# Patient Record
Sex: FEMALE | Race: WHITE | NOT HISPANIC OR LATINO | Employment: FULL TIME | URBAN - METROPOLITAN AREA
[De-identification: names, ages, dates, MRNs, and addresses within clinical notes are randomized per-mention and may not be internally consistent; named-entity substitution may affect disease eponyms.]

---

## 2023-08-23 LAB
GRAM STAIN: NORMAL
STERILE FLUID CULTURE/SMEAR: NORMAL

## 2023-08-26 LAB
GRAM STAIN: ABNORMAL
TISSUE/WOUND CULTURE/SMEAR: ABNORMAL

## 2024-01-02 ENCOUNTER — APPOINTMENT (OUTPATIENT)
Dept: OTOLARYNGOLOGY | Facility: CLINIC | Age: 69
End: 2024-01-02
Payer: COMMERCIAL

## 2024-06-19 ENCOUNTER — OFFICE VISIT (OUTPATIENT)
Dept: OTOLARYNGOLOGY | Facility: CLINIC | Age: 69
End: 2024-06-19
Payer: MEDICARE

## 2024-06-19 VITALS — BODY MASS INDEX: 17.18 KG/M2 | WEIGHT: 120 LBS | HEIGHT: 70 IN

## 2024-06-19 DIAGNOSIS — H71.92 CHOLESTEATOMA OF LEFT EAR: ICD-10-CM

## 2024-06-19 DIAGNOSIS — H73.001 ACUTE MYRINGITIS, RIGHT: ICD-10-CM

## 2024-06-19 DIAGNOSIS — H92.11 OTORRHEA OF RIGHT EAR: Primary | ICD-10-CM

## 2024-06-19 DIAGNOSIS — H66.11 CHRONIC TUBOTYMPANIC SUPPURATIVE OTITIS MEDIA OF RIGHT EAR: ICD-10-CM

## 2024-06-19 DIAGNOSIS — H72.91 TYMPANIC MEMBRANE PERFORATION, RIGHT: ICD-10-CM

## 2024-06-19 PROCEDURE — 1160F RVW MEDS BY RX/DR IN RCRD: CPT | Performed by: OTOLARYNGOLOGY

## 2024-06-19 PROCEDURE — 87205 SMEAR GRAM STAIN: CPT

## 2024-06-19 PROCEDURE — 87070 CULTURE OTHR SPECIMN AEROBIC: CPT

## 2024-06-19 PROCEDURE — 99214 OFFICE O/P EST MOD 30 MIN: CPT | Performed by: OTOLARYNGOLOGY

## 2024-06-19 PROCEDURE — 1159F MED LIST DOCD IN RCRD: CPT | Performed by: OTOLARYNGOLOGY

## 2024-06-19 PROCEDURE — 87075 CULTR BACTERIA EXCEPT BLOOD: CPT

## 2024-06-19 RX ORDER — PRAVASTATIN SODIUM 20 MG/1
20 TABLET ORAL
COMMUNITY
Start: 2016-06-14 | End: 2025-03-15

## 2024-06-19 RX ORDER — CHOLECALCIFEROL (VITAMIN D3) 25 MCG
2500 TABLET,CHEWABLE ORAL
COMMUNITY

## 2024-06-19 RX ORDER — ESTRADIOL 2 MG/1
2 TABLET ORAL
COMMUNITY
Start: 2022-10-12 | End: 2025-03-15

## 2024-06-19 RX ORDER — HYDROCORTISONE 25 MG/G
CREAM TOPICAL
COMMUNITY
Start: 2021-05-10

## 2024-06-19 RX ORDER — DOXYCYCLINE HYCLATE 100 MG/1
100 TABLET, DELAYED RELEASE ORAL 2 TIMES DAILY
Qty: 28 TABLET | Refills: 0 | Status: CANCELLED | OUTPATIENT
Start: 2024-06-19 | End: 2024-07-03

## 2024-06-19 RX ORDER — DOXYCYCLINE 100 MG/1
100 CAPSULE ORAL 2 TIMES DAILY
Qty: 28 CAPSULE | Refills: 0 | Status: SHIPPED | OUTPATIENT
Start: 2024-06-19 | End: 2024-07-03

## 2024-06-19 RX ORDER — CIPROFLOXACIN AND DEXAMETHASONE 3; 1 MG/ML; MG/ML
4 SUSPENSION/ DROPS AURICULAR (OTIC) 2 TIMES DAILY
Qty: 7.5 ML | Refills: 1 | Status: SHIPPED | OUTPATIENT
Start: 2024-06-19 | End: 2024-06-29

## 2024-06-19 RX ORDER — ACETAMINOPHEN 500 MG
500 TABLET ORAL
COMMUNITY

## 2024-06-19 NOTE — PROGRESS NOTES
History Of Present Illness:  Tiffanie Omer is a 69 y.o. female with a history of left cholesteatoma and right chronic suppurative otitis media with TM perforation, she is s/p left-sided postauricular CWU mastoidectomy with cholesteatoma resection on 2/7/14 with Dr. Turner, and right-sided tympanoplasty with ossiculoplasty on 10/2/20. She presents today due to blood-tinged otorrhea. She had an episode of drainage similar to this in May. She was treated by her PCP with ofloxacin drops, short prednisone taper, and amoxicillin. She finished treatment on 5/31 and had resolution of her symptoms, but she awoke with blood-tinged, mucus drainage on Monday of this week. She resumed her ofloxacin drops on that day but continues to have similar drainage. She denies ear pain, subjective hearing changes. Does endorse mild dysequilibrium which she gets when she has an ear infection.    Recall 9/26/23:  Overall she's feeling well, no longer having ear pain or drainage. Cultures obtained 08/2023 grew out achromobacter xylosoxidans resistant to ciprofloxacin, susceptible to levaquin, ceftazidime, gentamicin, tobramycin.    Recall 8/22/23:   Patient was doing well and then had episode with water exposure at the beach on vacation in July. Since then she has had on and off drainage that has been responsive to drops but will return shortly after stopping drops. No drainage today but had drainage yesterday.     Recall 2/21/23:   In the past 1.5 months she has had two right-sided ear infections that she self-treated with amoxicillin and otic drops that she had at home. This helped clear it up. Her infections start with crackling in the ear and progresses to otorrhea and otalgia that radiates into the neck. She has custom ear molds that she uses regularly to keep the ears dry. Any moisture in the ears will trigger an ear infection. Denies change in hearing.     Recall 2/15/22   She is doing well today, she denies any problems with her  ears. She had an infection before [facility], she used the Ciprodex drops and Antibiotics she had on hand. She had a hearing test in [place] and notes it's improved since her last test. She continues to follow up with Dr. Christensen and is very pleased with her results.      Recall at initial visit:  Tiffanie is a 63 yo female referred by Dr. Johnson. The patient is referred for evaluation of chronic right-sided ear infections. She endorses bilateral hearing loss, right-sided crackling and otorrhea. Patient had left tympanomastoidectomy for cholesteatoma with Dr Turner in 2014. At the time of her surgery, she had previous right PE tube removed. The perforation never healed. At that time, he did not recommend surgical repair since conductive component of hearing loss was not significant and TM appear to be retracted down to her stapes.  She now complains of frequent right otitis media. Any time she gets moisture buildup in her right ear canal, she develops an infection. These present with crackling sensation, otorrhea, sometimes otalgia. She reports approximately 4 infections in the past 3 months. She does have a custom ear molds to wear when showering. She does wear bilateral hearing aids.    When asked about a significant past otological history including history of prior ear surgery, noise exposure, exposure to ototoxic drugs or agents, and/or family history of hearing loss, the patient admits to PE tubes and left tympanomastoidectomy as mentioned above.     Surgical History:  She has a past surgical history that includes Tympanostomy tube placement (10/25/2013); Tonsillectomy (10/25/2013); Hysterectomy (10/25/2013); Gallbladder surgery (10/25/2013); Other surgical history (06/14/2016); Other surgical history (06/14/2016); Other surgical history (01/07/2020); Other surgical history (01/07/2020); and Tubal ligation (06/14/2016).     Allergies:  Amoxicillin-pot clavulanate, Hydromorphone, Nitroglycerin, Sulfa  (sulfonamide antibiotics), Latex, and Shellfish derived    Medications:   Current Outpatient Medications   Medication Instructions    acetaminophen (TYLENOL) 500 mg, oral    ciprofloxacin-dexamethasone (CiproDEX) otic suspension 4 drops, Right Ear, 2 times daily, Use in the affected ear or ears    cyanocobalamin (VITAMIN B-12) 2,500 mg, oral    doxycycline (VIBRAMYCIN) 100 mg, oral, 2 times daily, Take with at least 8 ounces (large glass) of water, do not lie down for 30 minutes after    estradiol (ESTRACE) 2 mg, oral, Daily RT    hydrocortisone 2.5 % cream Hydrocortisone 2.5 % External Cream   Quantity: 30  Refills: 0        Start : 10-May-2021   Active    pravastatin (PRAVACHOL) 20 mg, oral, Daily RT      Review of Systems:   A comprehensive 10-point review of systems was obtained including constitutional, neurological, HEENT, pulmonary, cardiovascular, genito-urinary, and other pertinent systems and was negative except as noted in the HPI.      Physical Exam:  Constitutional   General appearance: Healthy-appearing, well-nourished, well groomed, in no acute distress.   Ability to communicate: Normal communication without aids, normal voice quality.       Head and face: Atraumatic with no masses, lesions, or scarring.   Facial strength: Normal strength and symmetry, no synkinesis or facial tic.     Ears  Otoscopic examination:   Left ear canal clear and intact, cartilage graft in place in attic, neotympanum retracted around grafts but without evidence of cholesteatoma formation.  Right ear with thick mucoid effusion pooled against TM surface and pulsatile thick mucoid fluid emanating from anterior inferior ~5% perforation - this was cultured and then cleared with suction. There is some mucosalization of the inferior 1/2 of the TM around the perforation. Cartilage in good position posterosuperiorly, attic without debris or evidence of recurrent cholesteatoma.      Nose: Dorsum symmetric with no visible or palpable  "deformities.     Oral Cavity/Mouth  Lips, teeth, and gums: Normal lips, gums, and dentition.     Oropharynx: Mucosa moist, no lesions.     Neck: Symmetrical, trachea midline. No masses visible.     Neurological/Psychiatric  Cranial Nerve Examination: II - XII grossly intact.  Orientation to person, place, and time: Normal.  Mood and affect: Normal.     Skin: Normal without rashes or lesions.     Pulmonary  Respiratory effort: Chest expands symmetrically.     Cardiovascular: Good peripheral pulses  Peripheral vascular system: No varicosities, carotid pulse normal, no edema. No jugular venous distension.     Extremities: Appearance of extremities: Normal. Gait normal.     Procedure:  Ear Cleaning   Procedure: Ear Cleaning Due to Otorrhea   Indication: Debris in Ear Canal   Location: Right Ear  Prep: Nothing was placed in the ear canal(s) prior to the procedure.   Preformed by: The procedure was performed by the Provider.  Visualization Instrument: A Microscope and Speculum were placed in the ear canal(s) to visualize the ear canal debris.   Ear cleaning Instruments: Suction were used during the procedure.   Outcome: The procedure was successful.   Patient Status: The patient tolerated the procedure well.   Complications: There were no complications.   Patient Instructions: Dry Ear Precautions and application of otic drops x2 weeks.    Last Recorded Vitals:  Height 1.778 m (5' 10\"), weight 54.4 kg (120 lb).    Relevant Results:  I personally reviewed the culture results from 08/2023, which showed growth of Achromobacter xylosoxidans resistant to ciprofloxacin, susceptible to levaquin, ceftazidime, gentamicin, tobramycin.     Assessment/Plan   1. Otorrhea of right ear    2. 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 Cholesteatoma of left ear    4. Tympanic membrane perforation, right    5. Acute myringitis, right       Tiffanie Omer is a 69 y.o. female with a history of left cholesteatoma and " right chronic suppurative otitis media with TM perforation, she is s/p left-sided postauricular CWU mastoidectomy with cholesteatoma resection on 2/7/14 with Dr. Turner, and right-sided tympanoplasty with ossiculoplasty on 10/2/20. She presents today due to recurrence of right otorrhea. This was cultured and cleared under otomicroscopy today. Given myringitis of TM, will start her on ciprodex BID x2 weeks, along with PO doxycycline as she cannot tolerate augmentin. Dry ear precautions in the interim. Return to see Dr. Harden in 3-4 weeks.    I spent approximately 35 minutes evaluating the patient, cleaning the ear canal, reviewing the chart, and documenting in the patient chart.

## 2024-06-21 DIAGNOSIS — B37.0 THRUSH: ICD-10-CM

## 2024-06-21 RX ORDER — FLUCONAZOLE 100 MG/1
100 TABLET ORAL ONCE
Qty: 1 TABLET | Refills: 0 | Status: SHIPPED | OUTPATIENT
Start: 2024-06-21 | End: 2024-06-21

## 2024-06-21 RX ORDER — NYSTATIN 100000 [USP'U]/ML
5 SUSPENSION ORAL 3 TIMES DAILY
Qty: 210 ML | Refills: 0 | Status: SHIPPED | OUTPATIENT
Start: 2024-06-21 | End: 2024-07-05

## 2024-06-22 LAB
BACTERIA SPEC CULT: NORMAL
BACTERIA SPEC CULT: NORMAL
GRAM STN SPEC: NORMAL
GRAM STN SPEC: NORMAL

## 2024-07-22 NOTE — PROGRESS NOTES
History Of Present Illness:  Tiffanie Omer is a 69 y.o. female with a history of left cholesteatoma and right chronic suppurative otitis media with TM perforation, she is s/p left-sided postauricular CWU mastoidectomy with cholesteatoma resection on 2/7/14 with Dr. Turner, and right-sided tympanoplasty with ossiculoplasty on 10/2/20. She presents today for a 4 week follow-up for blood-tinged otorrhea after a course of oral and topical antibiotics.    Since her last visit, she had a brief improvement in her symptoms of drainage. When she stopped the oral antibiotics and topical antibiotics, her symptoms recurred several days ago. She denies pain but says she has had continued drainage from the right ear. No other new otologic symptoms.     Recall 6/19/24:  Tiffanie Omer is a 69 y.o. female with a history of left cholesteatoma and right chronic suppurative otitis media with TM perforation, she is s/p left-sided postauricular CWU mastoidectomy with cholesteatoma resection on 2/7/14 with Dr. Turner, and right-sided tympanoplasty with ossiculoplasty on 10/2/20. She presents today due to blood-tinged otorrhea. She had an episode of drainage similar to this in May. She was treated by her PCP with ofloxacin drops, short prednisone taper, and amoxicillin. She finished treatment on 5/31 and had resolution of her symptoms, but she awoke with blood-tinged, mucus drainage on Monday of this week. She resumed her ofloxacin drops on that day but continues to have similar drainage. She denies ear pain, subjective hearing changes. Does endorse mild dysequilibrium which she gets when she has an ear infection.     Surgical History:  She has a past surgical history that includes Tympanostomy tube placement (10/25/2013); Tonsillectomy (10/25/2013); Hysterectomy (10/25/2013); Gallbladder surgery (10/25/2013); Other surgical history (06/14/2016); Other surgical history (06/14/2016); Other surgical history (01/07/2020); Other  surgical history (01/07/2020); and Tubal ligation (06/14/2016).     Allergies:  Amoxicillin-pot clavulanate, Hydromorphone, Nitroglycerin, Sulfa (sulfonamide antibiotics), Latex, and Shellfish derived    Medications:   Current Outpatient Medications   Medication Instructions    acetaminophen (TYLENOL) 500 mg, oral    cyanocobalamin (VITAMIN B-12) 2,500 mg, oral    estradiol (ESTRACE) 2 mg, oral, Daily RT    hydrocortisone 2.5 % cream Hydrocortisone 2.5 % External Cream   Quantity: 30  Refills: 0        Start : 10-May-2021   Active    pravastatin (PRAVACHOL) 20 mg, oral, Daily RT      Review of Systems:   A comprehensive 10-point review of systems was obtained including constitutional, neurological, HEENT, pulmonary, cardiovascular, genito-urinary, and other pertinent systems and was negative except as noted in the HPI.      Physical Exam:  Constitutional   General appearance: Healthy-appearing, well-nourished, well groomed, in no acute distress.   Ability to communicate: Normal communication without aids, normal voice quality.       Head and face: Atraumatic with no masses, lesions, or scarring.   Facial strength: Normal strength and symmetry, no synkinesis or facial tic.     Ears  Otoscopic examination:   Left ear canal clear and intact, cartilage graft in place in attic, neotympanum retracted around grafts but without evidence of cholesteatoma formation.  Right ear with thick mucoid effusion pooled against TM surface and pulsatile thick mucoid fluid emanating from anterior inferior ~5% perforation - this was cultured and then cleared with suction. There is some mucosalization of the inferior 1/2 of the TM around the perforation. Cartilage in good position posterosuperiorly, attic without debris or evidence of recurrent cholesteatoma.      Nose: Dorsum symmetric with no visible or palpable deformities.     Oral Cavity/Mouth  Lips, teeth, and gums: Normal lips, gums, and dentition.     Oropharynx: Mucosa moist, no  lesions.     Neck: Symmetrical, trachea midline. No masses visible.     Neurological/Psychiatric  Cranial Nerve Examination: II - XII grossly intact.  Orientation to person, place, and time: Normal.  Mood and affect: Normal.     Skin: Normal without rashes or lesions.     Pulmonary  Respiratory effort: Chest expands symmetrically.     Cardiovascular: Good peripheral pulses  Peripheral vascular system: No varicosities, carotid pulse normal, no edema. No jugular venous distension.     Extremities: Appearance of extremities: Normal. Gait normal.     Last Recorded Vitals:  There were no vitals taken for this visit.    Relevant Results:  Collected 6/19/2024 16:40    Status: Final result    Dx: Otorrhea of right ear    Specimen Information: Ear; Tissue/Biopsy   0 Result Notes  Tissue/Wound Culture/Smear (2+) Few Mixed Gram-Positive Bacteria      (2+) Few Mixed Gram-Negative Bacteria              Assessment/Plan   69 year old female with a history of left cholesteatoma and right chronic suppurative otitis media with TM perforation, she is s/p left-sided postauricular CWU mastoidectomy with cholesteatoma resection on 2/7/14 with Dr. Turner, and right-sided tympanoplasty with ossiculoplasty on 10/2/20. She has had continued drainage from the right side which briefly improved with the use of topical and oral antibiotics. At this time, would recommend surgical repair of the perforation. Given it's far anterior location, she would likely require a postauricular approach for the drum alone. Given the unknown status of the mastoid, would recommend a CT IAC to assess the mastoid cavity. We discussed a mastoidectomy if the CT scan suggests disease extending to or possibly originating from the mastoid. We will prescribe her oral doxycyline and ciprodex given the turbid fluid from the perforation and follow-up with her after the CT scan is performed.    Patient was seen and discussed with Dr. Harden.    Nahum Peguero MD  PGY-5  [facility]  Ear, Nose & Throat [place]         I saw and evaluated the patient. I personally obtained the key and critical portions of the history and physical exam or was physically present for key and critical portions performed by the resident/fellow. I reviewed the resident/fellow's documentation and discussed the patient with the resident/fellow. I agree with the resident/fellow's medical decision making as documented in the note.    Rosana Harden MD

## 2024-07-23 ENCOUNTER — APPOINTMENT (OUTPATIENT)
Dept: OTOLARYNGOLOGY | Facility: CLINIC | Age: 69
End: 2024-07-23
Payer: COMMERCIAL

## 2024-07-23 DIAGNOSIS — H72.91 TYMPANIC MEMBRANE PERFORATION, RIGHT: ICD-10-CM

## 2024-07-23 DIAGNOSIS — H66.11 CHRONIC TUBOTYMPANIC SUPPURATIVE OTITIS MEDIA OF RIGHT EAR: ICD-10-CM

## 2024-07-23 PROCEDURE — 69210 REMOVE IMPACTED EAR WAX UNI: CPT | Performed by: OTOLARYNGOLOGY

## 2024-07-23 PROCEDURE — 99214 OFFICE O/P EST MOD 30 MIN: CPT | Performed by: OTOLARYNGOLOGY

## 2024-07-23 PROCEDURE — 1160F RVW MEDS BY RX/DR IN RCRD: CPT | Performed by: OTOLARYNGOLOGY

## 2024-07-23 PROCEDURE — 1159F MED LIST DOCD IN RCRD: CPT | Performed by: OTOLARYNGOLOGY

## 2024-07-23 RX ORDER — DOXYCYCLINE 100 MG/1
100 CAPSULE ORAL 2 TIMES DAILY
Qty: 14 CAPSULE | Refills: 0 | Status: SHIPPED | OUTPATIENT
Start: 2024-07-23 | End: 2024-07-30

## 2024-07-23 RX ORDER — CIPROFLOXACIN AND DEXAMETHASONE 3; 1 MG/ML; MG/ML
4 SUSPENSION/ DROPS AURICULAR (OTIC) 2 TIMES DAILY
Qty: 7.5 ML | Refills: 0 | Status: SHIPPED | OUTPATIENT
Start: 2024-07-23 | End: 2024-07-30

## 2024-07-23 ASSESSMENT — PATIENT HEALTH QUESTIONNAIRE - PHQ9
2. FEELING DOWN, DEPRESSED OR HOPELESS: NOT AT ALL
SUM OF ALL RESPONSES TO PHQ9 QUESTIONS 1 AND 2: 1
1. LITTLE INTEREST OR PLEASURE IN DOING THINGS: SEVERAL DAYS

## 2024-07-23 NOTE — LETTER
July 24, 2024     Magan Bryant DO  [address] [facility] [id number]    Patient: Tiffanie Omer   YOB: 1955   Date of Visit: 7/23/2024       Dear Dr. Magan Bryant DO:    I had the pleasure of seeing your patient, Tiffanie Omer today. Below are my notes for this consultation.  If you have questions, please do not hesitate to call me. Thank you for allowing me to participate in the care of your patient.        Sincerely,     Rosana Harden MD      CC: No Recipients  _____________________________________________________________________________    69 year old female with a history of left cholesteatoma and right chronic suppurative otitis media with TM perforation, she is s/p left-sided postauricular CWU mastoidectomy with cholesteatoma resection on 2/7/14 with Dr. Turner, and right-sided tympanoplasty with ossiculoplasty on 10/2/20. She has had continued drainage from the right side which briefly improved with the use of topical and oral antibiotics. At this time, would recommend surgical repair of the perforation. Given it's far anterior location, she would likely require a postauricular approach for the drum alone. Given the unknown status of the mastoid, would recommend a CT IAC to assess the mastoid cavity. We discussed a mastoidectomy if the CT scan suggests disease extending to or possibly originating from the mastoid. We will prescribe her oral doxycyline and ciprodex given the turbid fluid from the perforation and follow-up with her after the CT scan is performed.    Patient was seen and discussed with Dr. Harden.    Nahum Peguero MD PGY-5  [facility]  Ear, Nose & Throat [place]

## 2024-07-30 ENCOUNTER — HOSPITAL ENCOUNTER (OUTPATIENT)
Dept: RADIOLOGY | Facility: CLINIC | Age: 69
Discharge: HOME | End: 2024-07-30
Payer: MEDICARE

## 2024-07-30 DIAGNOSIS — H72.91 TYMPANIC MEMBRANE PERFORATION, RIGHT: ICD-10-CM

## 2024-07-30 PROCEDURE — 70480 CT ORBIT/EAR/FOSSA W/O DYE: CPT

## 2024-07-30 PROCEDURE — 70480 CT ORBIT/EAR/FOSSA W/O DYE: CPT | Performed by: RADIOLOGY

## 2024-08-02 ENCOUNTER — TELEPHONE (OUTPATIENT)
Dept: OTOLARYNGOLOGY | Facility: CLINIC | Age: 69
End: 2024-08-02
Payer: COMMERCIAL

## 2024-08-02 NOTE — TELEPHONE ENCOUNTER
Called patient regarding CT results. Per Dr. Harden, the imaging shows fluid in the mastoid and middle ear space. She would like a follow up appointment to discuss next steps. I instructed patient that I am able to schedule 8/12 when she returns to the office.

## 2024-08-07 DIAGNOSIS — Z01.818 PREOPERATIVE CLEARANCE: ICD-10-CM

## 2024-08-07 DIAGNOSIS — H72.91 TYMPANIC MEMBRANE PERFORATION, RIGHT: ICD-10-CM

## 2024-08-11 NOTE — PROGRESS NOTES
An interactive audio and video telecommunication system which permits real time communications between the patient (at the originating site) and provider (at the distant site) was utilized to provide this telehealth service.  Verbal consent was requested and obtained for a telehealth visit.      History Of Present Illness:  Tiffanie Omer is a 69 y.o. female with a history of left cholesteatoma and right chronic suppurative otitis media with TM perforation, she is s/p left-sided postauricular CWU mastoidectomy with cholesteatoma resection on 2/7/14 with Dr. Turner, and right-sided tympanoplasty with ossiculoplasty on 10/2/20. She presents today to discuss her CT results from 7/30/24.     Since her last visit, she has had an improvement in her symptoms of drainage. She has finished her course of doxycycline 100 mg and has not experienced drainage since.      She is currently scheduled for right side postauricular tympanoplasty with cartilage graft on 9/23/24.    Recall 7/23/24:  Tiffanie Omer is a 69 y.o. female with a history of left cholesteatoma and right chronic suppurative otitis media with TM perforation, she is s/p left-sided postauricular CWU mastoidectomy with cholesteatoma resection on 2/7/14 with Dr. Turner, and right-sided tympanoplasty with ossiculoplasty on 10/2/20. She presents today for a 4 week follow-up for blood-tinged otorrhea after a course of oral and topical antibiotics.     Since her last visit, she had a brief improvement in her symptoms of drainage. When she stopped the oral antibiotics and topical antibiotics, her symptoms recurred several days ago. She denies pain but says she has had continued drainage from the right ear. No other new otologic symptoms.    Surgical History:  She has a past surgical history that includes Tympanostomy tube placement (10/25/2013); Tonsillectomy (10/25/2013); Hysterectomy (10/25/2013); Gallbladder surgery (10/25/2013); Other surgical history  (06/14/2016); Other surgical history (06/14/2016); Other surgical history (01/07/2020); Other surgical history (01/07/2020); and Tubal ligation (06/14/2016).     Allergies:  Amoxicillin-pot clavulanate, Hydromorphone, Nitroglycerin, Sulfa (sulfonamide antibiotics), Latex, and Shellfish derived    Medications:   Current Outpatient Medications   Medication Instructions    acetaminophen (TYLENOL) 500 mg, oral    cyanocobalamin (VITAMIN B-12) 2,500 mg, oral    estradiol (ESTRACE) 2 mg, oral, Daily RT    hydrocortisone 2.5 % cream Hydrocortisone 2.5 % External Cream   Quantity: 30  Refills: 0        Start : 10-May-2021   Active    pravastatin (PRAVACHOL) 20 mg, oral, Daily RT      Review of Systems:   A comprehensive 10-point review of systems was obtained including constitutional, neurological, HEENT, pulmonary, cardiovascular, genito-urinary, and other pertinent systems and was negative except as noted in the HPI.      Physical Exam:  Patient appeared healthy with no signs of acute distress, normal facial nerve function, visit was conducted via video chat therefore a thorough physical exam was not preformed.     Last Recorded Vitals:  There were no vitals taken for this visit.    Relevant Results:  I personally reviewed the CT internal auditory canals posterior fossa without contrast from 7/30/24 which demonstrated interval increase soft tissue mass in the right middle ear ossicular chain, predominantly involves the mesotympanum with some extension into the epitympanum and hypotympanum. Question possible erosion of the malleus. Findings are most consistent with retained fluid and granulation tissue. No evidence of erosion within the middle ear or antral tectum. Linear soft tissue density material observed adjacent to the head of the left malleus without observed erosion. This is thought to represent scar or granulation tissue.     Assessment/Plan   69 y.o. female with a history of left cholesteatoma and right chronic  suppurative otitis media with TM perforation, she is s/p left-sided postauricular CWU mastoidectomy with cholesteatoma resection on 2/7/14 with Dr. Turner, and right-sided tympanoplasty with ossiculoplasty on 10/2/20. She presents today to discuss her CT results from 7/30/24. Since her last visit, she has had an improvement in her symptoms of drainage. She has finished her course of doxycycline 100 mg and has not experienced drainage since. At this time, we would recommend scheduling a right-sided tympanoplasty with revision ossiculoplasty without mastoidectomy.     -Patient will proceed with right-sided tympanoplasty with revision ossiculoplasty without mastoidectomy which is currently scheduled on 9/23/24.     A total of 15 minutes was spent with this patient.         Scribe Attestation  By signing my name below, IMleissa Scrgriselda   attest that this documentation has been prepared under the direction and in the presence of Rosana Harden MD.    I have reviewed the documentation as scribed by Grayson Calhoun and agree with the notes.   Rosana Harden MD

## 2024-08-12 ENCOUNTER — APPOINTMENT (OUTPATIENT)
Dept: OTOLARYNGOLOGY | Facility: CLINIC | Age: 69
End: 2024-08-12
Payer: COMMERCIAL

## 2024-08-12 DIAGNOSIS — H72.91 TYMPANIC MEMBRANE PERFORATION, RIGHT: Primary | ICD-10-CM

## 2024-08-12 DIAGNOSIS — H66.11 CHRONIC TUBOTYMPANIC SUPPURATIVE OTITIS MEDIA OF RIGHT EAR: ICD-10-CM

## 2024-08-12 PROCEDURE — 1159F MED LIST DOCD IN RCRD: CPT | Performed by: OTOLARYNGOLOGY

## 2024-08-12 PROCEDURE — 1160F RVW MEDS BY RX/DR IN RCRD: CPT | Performed by: OTOLARYNGOLOGY

## 2024-08-12 PROCEDURE — 99212 OFFICE O/P EST SF 10 MIN: CPT | Performed by: OTOLARYNGOLOGY

## 2024-08-28 DIAGNOSIS — H92.11 OTORRHEA OF RIGHT EAR: ICD-10-CM

## 2024-08-28 RX ORDER — DOXYCYCLINE 100 MG/1
100 CAPSULE ORAL 2 TIMES DAILY
Qty: 14 CAPSULE | Refills: 0 | Status: SHIPPED | OUTPATIENT
Start: 2024-08-28 | End: 2024-09-04

## 2024-08-30 ENCOUNTER — CLINICAL SUPPORT (OUTPATIENT)
Dept: PREADMISSION TESTING | Facility: HOSPITAL | Age: 69
End: 2024-08-30
Payer: MEDICARE

## 2024-08-30 DIAGNOSIS — H72.91 TYMPANIC MEMBRANE PERFORATION, RIGHT: ICD-10-CM

## 2024-08-30 RX ORDER — NICOTINE POLACRILEX 2 MG
1 GUM BUCCAL DAILY
COMMUNITY

## 2024-08-30 RX ORDER — BISMUTH SUBSALICYLATE 262 MG
1 TABLET,CHEWABLE ORAL DAILY
COMMUNITY

## 2024-09-09 ENCOUNTER — DOCUMENTATION (OUTPATIENT)
Dept: PREADMISSION TESTING | Facility: HOSPITAL | Age: 69
End: 2024-09-09

## 2024-09-09 ENCOUNTER — LAB (OUTPATIENT)
Dept: LAB | Facility: LAB | Age: 69
End: 2024-09-09
Payer: MEDICARE

## 2024-09-09 ENCOUNTER — PRE-ADMISSION TESTING (OUTPATIENT)
Dept: PREADMISSION TESTING | Facility: HOSPITAL | Age: 69
End: 2024-09-09
Payer: MEDICARE

## 2024-09-09 VITALS
RESPIRATION RATE: 16 BRPM | SYSTOLIC BLOOD PRESSURE: 119 MMHG | DIASTOLIC BLOOD PRESSURE: 58 MMHG | OXYGEN SATURATION: 98 % | HEIGHT: 69 IN | HEART RATE: 64 BPM | TEMPERATURE: 96.6 F | BODY MASS INDEX: 18.87 KG/M2 | WEIGHT: 127.43 LBS

## 2024-09-09 DIAGNOSIS — Z01.818 PREOPERATIVE CLEARANCE: ICD-10-CM

## 2024-09-09 LAB
ALBUMIN SERPL BCP-MCNC: 3.8 G/DL (ref 3.4–5)
ALP SERPL-CCNC: 54 U/L (ref 33–136)
ALT SERPL W P-5'-P-CCNC: 8 U/L (ref 7–45)
ANION GAP SERPL CALC-SCNC: 9 MMOL/L (ref 10–20)
AST SERPL W P-5'-P-CCNC: 13 U/L (ref 9–39)
BILIRUB SERPL-MCNC: 0.4 MG/DL (ref 0–1.2)
BUN SERPL-MCNC: 10 MG/DL (ref 6–23)
CALCIUM SERPL-MCNC: 8.8 MG/DL (ref 8.6–10.3)
CHLORIDE SERPL-SCNC: 103 MMOL/L (ref 98–107)
CO2 SERPL-SCNC: 29 MMOL/L (ref 21–32)
CREAT SERPL-MCNC: 0.63 MG/DL (ref 0.5–1.05)
EGFRCR SERPLBLD CKD-EPI 2021: >90 ML/MIN/1.73M*2
ERYTHROCYTE [DISTWIDTH] IN BLOOD BY AUTOMATED COUNT: 12.7 % (ref 11.5–14.5)
GLUCOSE SERPL-MCNC: 79 MG/DL (ref 74–99)
HCT VFR BLD AUTO: 38.8 % (ref 36–46)
HGB BLD-MCNC: 12.6 G/DL (ref 12–16)
MCH RBC QN AUTO: 32.5 PG (ref 26–34)
MCHC RBC AUTO-ENTMCNC: 32.5 G/DL (ref 32–36)
MCV RBC AUTO: 100 FL (ref 80–100)
NRBC BLD-RTO: 0 /100 WBCS (ref 0–0)
PLATELET # BLD AUTO: 364 X10*3/UL (ref 150–450)
POTASSIUM SERPL-SCNC: 3.9 MMOL/L (ref 3.5–5.3)
PROT SERPL-MCNC: 6.1 G/DL (ref 6.4–8.2)
RBC # BLD AUTO: 3.88 X10*6/UL (ref 4–5.2)
SODIUM SERPL-SCNC: 137 MMOL/L (ref 136–145)
WBC # BLD AUTO: 6.8 X10*3/UL (ref 4.4–11.3)

## 2024-09-09 PROCEDURE — 80053 COMPREHEN METABOLIC PANEL: CPT

## 2024-09-09 PROCEDURE — 36415 COLL VENOUS BLD VENIPUNCTURE: CPT

## 2024-09-09 PROCEDURE — 85027 COMPLETE CBC AUTOMATED: CPT

## 2024-09-09 PROCEDURE — 99204 OFFICE O/P NEW MOD 45 MIN: CPT | Performed by: PHYSICIAN ASSISTANT

## 2024-09-09 ASSESSMENT — ENCOUNTER SYMPTOMS
EXCESSIVE BLEEDING: 0
DIARRHEA: 0
CHILLS: 0
MYALGIAS: 0
EYE PAIN: 0
ABDOMINAL DISTENTION: 0
WEAKNESS: 0
VOMITING: 0
CONFUSION: 0
NECK STIFFNESS: 0
VISUAL CHANGE: 0
FEVER: 0
TROUBLE SWALLOWING: 0
NUMBNESS: 0
NECK PAIN: 0
DOUBLE VISION: 0
CONSTIPATION: 0
COUGH: 0
WOUND: 0
DYSURIA: 0
ABDOMINAL PAIN: 0
RHINORRHEA: 0
DYSPNEA AT REST: 0
EYE DISCHARGE: 0
DYSPNEA WITH EXERTION: 0
LIMITED RANGE OF MOTION: 0
NAUSEA: 0
DIFFICULTY URINATING: 0
ARTHRALGIAS: 1
LIGHT-HEADEDNESS: 0
BRUISES/BLEEDS EASILY: 0
SKIN CHANGES: 0
PALPITATIONS: 0
HEMOPTYSIS: 0
SHORTNESS OF BREATH: 0
UNEXPECTED WEIGHT CHANGE: 0
SINUS CONGESTION: 0
WHEEZING: 0
BLOOD IN STOOL: 0

## 2024-09-09 NOTE — PREPROCEDURE INSTRUCTIONS
Medication List            Accurate as of September 9, 2024  1:20 PM. Always use your most recent med list.                acetaminophen 500 mg tablet  Commonly known as: Tylenol  Medication Adjustments for Surgery: Take on the morning of surgery  Notes to patient: If needed     aspirin-acetaminophen-caffeine 250-250-65 mg tablet  Commonly known as: Excedrin Migraine  Additional Medication Adjustments for Surgery: Take last dose 7 days before surgery     biotin 1 mg capsule  Additional Medication Adjustments for Surgery: Take last dose 7 days before surgery     COQ10 (UBIQUINOL) ORAL  Additional Medication Adjustments for Surgery: Take last dose 7 days before surgery     cyanocobalamin 2,500 mcg tablet  Commonly known as: Vitamin B-12  Medication Adjustments for Surgery: Do Not take on the morning of surgery     estradiol 2 mg tablet  Commonly known as: Estrace  Medication Adjustments for Surgery: Do Not take on the morning of surgery     multivitamin tablet  Additional Medication Adjustments for Surgery: Take last dose 7 days before surgery     pravastatin 20 mg tablet  Commonly known as: Pravachol  Medication Adjustments for Surgery: Take on the morning of surgery                          **Concerning above medication instructions, if medication is normally taken at night, continue normal schedule.**  **DO NOT TAKE NIGHT PRIOR AND MORNING OF SURGERY**    CONTACT SURGEON'S OFFICE IF YOU DEVELOP:  * Fever = 100.4 F   * New respiratory symptoms (e.g. cough, shortness of breath, respiratory distress, sore throat)  * Recent loss of taste or smell  *Flu like symptoms such as headache, fatigue or gastrointestinal symptoms  * You develop any open sores, shingles, burning or painful urination   AND/OR:  * You no longer wish to have the surgery.  * Any other personal circumstances change that may lead to the need to cancel or defer this surgery.  *You were admitted to any hospital within one week of your planned  procedure.    SMOKING:  *Quitting smoking can make a huge difference to your health and recovery from surgery.    *If you need help with quitting, call 3-747-QUIT-NOW.    THE DAY BEFORE SURGERY:  *Do not eat any food after midnight the night before surgery.   *You are permitted to drink clear liquids (i.e. water, black coffee (no milk or cream), tea, apple juice and electrolyte drinks (gatorade)) up to 13.5 ounces, up to 2 hours before your arrival time.  *You may chew gum until 2 hours before your surgery    SURGICAL TIME  *You will be contacted between 2 p.m. and 6 p.m. the business day before your surgery with your arrival time.  *If you haven't received a call by 6pm, call [phone].  *Scheduled surgery times may change and you will be notified if this occurs-check your personal voicemail for any updates.    ON THE MORNING OF SURGERY:  *Wear comfortable, loose fitting clothing.   *Do not use moisturizers, creams, lotions or perfume.  *All jewelry and valuables should be left at home.  *Prosthetic devices such as contact lenses, hearing aids, dentures, eyelash extensions, hairpins and body piercing must be removed before surgery.    BRING WITH YOU:  *Photo ID and insurance card  *Current list of medicines and allergies  *Pacemaker/Defibrillator/Heart stent cards  *CPAP machine and mask  *Slings/splints/crutches  *Copy of your complete Advanced Directive/DHPOA-if applicable  *Neurostimulator implant remote    PARKING AND ARRIVAL:  *Check in at the Main Entrance desk and let them know you are here for surgery.  *You will be directed to the 2nd floor surgical waiting area.    AFTER OUTPATIENT SURGERY:  *A responsible adult MUST accompany you at the time of discharge and stay with you for 24 hours after your surgery.  *You may NOT drive yourself home after surgery.  *You may use a taxi or ride sharing service ([employer], Uber) to return home ONLY if you are accompanied by a friend or family member.  *Instructions for  resuming your medications will be provided by your surgeon.

## 2024-09-09 NOTE — CPM/PAT H&P
[facility] Evaluation       Name: Tiffanie Omer (Tiffanie Omer)  /Age: 1955/69 y.o.       Date of Consult: 24    Referring Provider: Dr. Harden    Surgery, Date, and Length: Right Side Postauricular Tympanoplasty, revision ossiculoplasty, Cartilage Graft - Right , 24, 120MIN    Tiffanie Omer is a 69 year-old female who presents to the [facility] for perioperative risk assessment prior to surgery.    Patient presents with a primary diagnosis of chronic right suppurative otitis media with TM perforation.  She is s/p right-sided tympanoplasty with ossiculoplasty on 10/2/20. She has had blood-tinged otorrhea which began in 2023.  She has taken oral and otic antibiotics; both of which are now complete. Pt wishes to proceed with surgery as planned.     This note was created in part upon personal review of patient's medical records.      Patient is scheduled to have Right Side Postauricular Tympanoplasty, revision ossiculoplasty, Cartilage Graft - Right     Pt admits to PONV in the past, at time of eye lift / brow lift at [facility] in 2020 - no issues since.  Pt denies any past history of anesthetic complications such as PONV, awareness, prolonged sedation, dental damage, aspiration, cardiac arrest, difficult intubation, difficult I.V. access or unexpected hospital admissions.  NO malignant hyperthermia and or pseudocholinesterase deficiency.  No history of blood transfusions     The patient is not a [employer] and will accept blood and blood products if medically indicated.   Type and screen NOT sent.     Past Medical History:   Diagnosis Date    Adverse effect of anesthesia     disoriented    Anemia     Anxiety and depression     Hyperlipidemia     Migraines     Mitral valve prolapse     PONV (postoperative nausea and vomiting)     Tympanic membrane perforation, right     Plan: Right Side Postauricular Tympanoplasty, revision ossiculoplasty, Cartilage Graft 24       Past Surgical  History:   Procedure Laterality Date    APPENDECTOMY      CHOLECYSTECTOMY      COSMETIC SURGERY      GALLBLADDER SURGERY      HYSTERECTOMY      MASTECTOMY, PARTIAL Left     MASTOID SURGERY      TONSILLECTOMY      TUBAL LIGATION      TYMPANOSTOMY TUBE PLACEMENT      Ear Pressure Equalization Tube       Patient Sexual activity questions deferred to the physician.    Family History   Problem Relation Name Age of Onset    COPD Mother      Asthma Mother      COPD Father      Hodgkin's lymphoma Brother       Social History     Socioeconomic History    Marital status:      Spouse name: Not on file    Number of children: Not on file    Years of education: Not on file    Highest education level: Not on file   Occupational History    Not on file   Tobacco Use    Smoking status: Former     Current packs/day: 0.00     Average packs/day: 1 pack/day for 41.0 years (41.0 ttl pk-yrs)     Types: Cigarettes     Start date:      Quit date:      Years since quittin.6    Smokeless tobacco: Not on file    Tobacco comments:     Patient states she vapes    Vaping Use    Vaping status: Every Day    Start date: 2020    Substances: Nicotine    Devices: Disposable   Substance and Sexual Activity    Alcohol use: Not Currently    Drug use: Never    Sexual activity: Defer   Other Topics Concern    Not on file   Social History Narrative    Not on file     Social Determinants of Health     Financial Resource Strain: Not on file   Food Insecurity: Not on file   Transportation Needs: Not on file   Physical Activity: Not on file   Stress: Not on file   Social Connections: Not on file   Intimate Partner Violence: Not on file   Housing Stability: Not on file        Allergies   Allergen Reactions    Amoxicillin-Pot Clavulanate Nausea/vomiting    Hydromorphone Other     Hypotension     Latex Rash    Nitroglycerin Other     Hypotension     Shellfish Derived Diarrhea and Nausea/vomiting    Sulfa (Sulfonamide Antibiotics) Other      Burning to tongue and throat          Current Outpatient Medications:     acetaminophen (Tylenol) 500 mg tablet, Take 1 tablet (500 mg) by mouth if needed for mild pain (1 - 3)., Disp: , Rfl:     aspirin-acetaminophen-caffeine (Excedrin Migraine) 250-250-65 mg tablet, Take 1 tablet by mouth if needed for headaches., Disp: , Rfl:     biotin 1 mg capsule, Take 1 capsule (1 mg) by mouth once daily., Disp: , Rfl:     COQ10, UBIQUINOL, ORAL, Take 1 Dose by mouth once daily., Disp: , Rfl:     cyanocobalamin (Vitamin B-12) 2,500 mcg tablet, Take 1,000 tablets (2,500,000 mcg) by mouth., Disp: , Rfl:     estradiol (Estrace) 2 mg tablet, Take 1 tablet (2 mg) by mouth once daily., Disp: , Rfl:     multivitamin tablet, Take 1 tablet by mouth once daily. PRENATAL, Disp: , Rfl:     pravastatin (Pravachol) 20 mg tablet, Take 1 tablet (20 mg) by mouth once daily., Disp: , Rfl:          PAT ROS:   Constitutional:    no fever   no chills   no unexpected weight change  Neuro/Psych:    no numbness   no weakness   no light-headedness   no confusion  Eyes:    no discharge   no pain   no vision loss   no diplopia   no visual disturbance  Ears:    no ear pain   ear discharge (right ear)   hearing loss   no tinnitus   hearing aides  Nose:    no nasal discharge   no sinus congestion   no epistaxis  Mouth:    no dental issues   no mouth pain   no oral bleeding   no mouth lesions  Throat:    no throat pain   no dysphagia  Neck:    no neck pain   no neck stiffness  Cardio:    Functional 4 Mets. 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   no chest pain   no palpitations   no peripheral edema   no dyspnea   no HOLCOMB  Respiratory:    no cough   no wheezing   no hemoptysis   no shortness of breath  Endocrine:    no cold intolerance   no heat intolerance  GI:    no abdominal distention   no abdominal pain   no constipation   no diarrhea   no nausea   no vomiting   no  blood in stool  :    no difficulty urinating   no dysuria   no oliguria  Musculoskeletal:    arthralgias (b/l hands)   no myalgias   no decreased ROM  Hematologic:    does not bruise/bleed easily   no excessive bleeding   no history of blood transfusion   no blood clots  Skin:   no skin changes   no sores/wound   no rash      Physical Exam  Constitutional:       General: She is not in acute distress.     Appearance: Normal appearance. She is not ill-appearing, toxic-appearing or diaphoretic.   HENT:      Head: Normocephalic and atraumatic.      Comments: Perforated right TM     Right Ear: External ear normal.      Nose: Nose normal. No rhinorrhea.      Mouth/Throat:      Pharynx: No oropharyngeal exudate.   Eyes:      Extraocular Movements: Extraocular movements intact.      Conjunctiva/sclera: Conjunctivae normal.   Cardiovascular:      Rate and Rhythm: Normal rate and regular rhythm.      Heart sounds: No murmur heard.     No friction rub. No gallop.      Comments: Functional 4 Mets. Patient denies SOB walking up 2 flights of stairs     Pulmonary:      Effort: Pulmonary effort is normal. No respiratory distress.      Breath sounds: Normal breath sounds. No stridor. No wheezing or rhonchi.   Abdominal:      General: Bowel sounds are normal. There is no distension.      Palpations: Abdomen is soft. There is no mass.      Tenderness: There is no abdominal tenderness. There is no guarding or rebound.      Hernia: No hernia is present.   Musculoskeletal:         General: No swelling, tenderness, deformity or signs of injury. Normal range of motion.      Cervical back: Normal range of motion and neck supple. No rigidity or tenderness.   Skin:     General: Skin is warm and dry.      Coloration: Skin is not jaundiced or pale.      Findings: No bruising, erythema, lesion or rash.   Neurological:      General: No focal deficit present.      Mental Status: She is alert and oriented to person, place, and time.      Cranial  "Nerves: No cranial nerve deficit.      Sensory: No sensory deficit.      Motor: No weakness.      Coordination: Coordination normal.   Psychiatric:         Mood and Affect: Mood normal.         Behavior: Behavior normal.          PAT AIRWAY:   Airway:     Mallampati::  II    Neck ROM::  Full   No broken teeth, no dentures and no missing teeth          Visit Vitals  /58   Pulse 64   Temp 35.9 °C (96.6 °F)   Resp 16   Ht 1.75 m (5' 8.9\")   Wt 57.8 kg (127 lb 6.8 oz)   SpO2 98%   BMI 18.87 kg/m²   Smoking Status Former   BSA 1.68 m²      LABS:  Lab Results   Component Value Date    WBC 6.8 09/09/2024    HGB 12.6 09/09/2024    HCT 38.8 09/09/2024     09/09/2024     09/09/2024      Lab Results   Component Value Date    GLUCOSE 79 09/09/2024    CALCIUM 8.8 09/09/2024     09/09/2024    K 3.9 09/09/2024    CO2 29 09/09/2024     09/09/2024    BUN 10 09/09/2024    CREATININE 0.63 09/09/2024      Lab Results   Component Value Date    ALT 8 09/09/2024    AST 13 09/09/2024    ALKPHOS 54 09/09/2024    BILITOT 0.4 09/09/2024        Assessment and Plan:     Patient is a 69-year-old female scheduled for a Right Side Postauricular Tympanoplasty, revision ossiculoplasty, Cartilage Graft - Right  with Dr. Harden on 9/23/24.    Patient has no active cardiac symptoms.   Patient denies any chest pain, tightness, heaviness, pressure, radiating pain, palpitations, irregular heartbeats, lightheadedness, cough, congestion, shortness of breath, HOLCOMB, PND, near syncope, weight loss or gain.     RCRI  0  , 3.9 % Risk of MACE    Cardiac:  HLD - cont statin on dos     MVP - no murmur on exam    Hematology:  Anemia - lab results show H/H wnl today    Patient instructed to ambulate as soon as possible postoperatively to decrease thromboembolic risk.   Initiate mechanical DVT prophylaxis as soon as possible and initiate chemical prophylaxis when deemed safe from a bleeding standpoint post surgery.     LABS: CBC, CMP " ordered. Lab results reviewed and unremarkable.     STOP BANG: >50 = 1    Caprini: 4    Risk assessment complete.  Patient is scheduled for a low to intermediate surgical risk procedure.        Preoperative medication instructions were provided and reviewed with the patient.  Any additional testing or evaluation was explained to the patient.  Nothing by mouth instructions were discussed and patient's questions were answered prior to conclusion to this encounter.  Patient verbalized understanding of preoperative instructions given in preadmission testing; discharge instructions available in EMR.    This note was dictated by a speech recognition.  Minor errors may have been detected in a speech recognition.

## 2024-09-09 NOTE — PREPROCEDURE INSTRUCTIONS
Medication List            Accurate as of September 9, 2024  1:13 PM. Always use your most recent med list.                acetaminophen 500 mg tablet  Commonly known as: Tylenol  Medication Adjustments for Surgery: Take on the morning of surgery  Notes to patient: If needed     aspirin-acetaminophen-caffeine 250-250-65 mg tablet  Commonly known as: Excedrin Migraine  Additional Medication Adjustments for Surgery: Take last dose 7 days before surgery     biotin 1 mg capsule  Additional Medication Adjustments for Surgery: Take last dose 7 days before surgery     COQ10 (UBIQUINOL) ORAL  Additional Medication Adjustments for Surgery: Take last dose 7 days before surgery     cyanocobalamin 2,500 mcg tablet  Commonly known as: Vitamin B-12  Medication Adjustments for Surgery: Do Not take on the morning of surgery     estradiol 2 mg tablet  Commonly known as: Estrace  Medication Adjustments for Surgery: Do Not take on the morning of surgery     multivitamin tablet  Additional Medication Adjustments for Surgery: Take last dose 7 days before surgery     pravastatin 20 mg tablet  Commonly known as: Pravachol  Medication Adjustments for Surgery: Take on the morning of surgery                          **Concerning above medication instructions, if medication is normally taken at night, continue normal schedule.**  **DO NOT TAKE NIGHT PRIOR AND MORNING OF SURGERY**    CONTACT SURGEON'S OFFICE IF YOU DEVELOP:  * Fever = 100.4 F   * New respiratory symptoms (e.g. cough, shortness of breath, respiratory distress, sore throat)  * Recent loss of taste or smell  *Flu like symptoms such as headache, fatigue or gastrointestinal symptoms  * You develop any open sores, shingles, burning or painful urination   AND/OR:  * You no longer wish to have the surgery.  * Any other personal circumstances change that may lead to the need to cancel or defer this surgery.  *You were admitted to any hospital within one week of your planned  procedure.    SMOKING:  *Quitting smoking can make a huge difference to your health and recovery from surgery.    *If you need help with quitting, call 1-324-QUIT-NOW.    THE DAY BEFORE SURGERY:  *Do not eat any food after midnight the night before surgery.   *You are permitted to drink clear liquids (i.e. water, black coffee (no milk or cream), tea, apple juice and electrolyte drinks (gatorade)) up to 13.5 ounces, up to 2 hours before your arrival time.  *You may chew gum until 2 hours before your surgery    SURGICAL TIME  *You will be contacted between 2 p.m. and 6 p.m. the business day before your surgery with your arrival time.  *If you haven't received a call by 6pm, call [phone].  *Scheduled surgery times may change and you will be notified if this occurs-check your personal voicemail for any updates.    ON THE MORNING OF SURGERY:  *Wear comfortable, loose fitting clothing.   *Do not use moisturizers, creams, lotions or perfume.  *All jewelry and valuables should be left at home.  *Prosthetic devices such as contact lenses, hearing aids, dentures, eyelash extensions, hairpins and body piercing must be removed before surgery.    BRING WITH YOU:  *Photo ID and insurance card  *Current list of medicines and allergies  *Pacemaker/Defibrillator/Heart stent cards  *CPAP machine and mask  *Slings/splints/crutches  *Copy of your complete Advanced Directive/DHPOA-if applicable  *Neurostimulator implant remote    PARKING AND ARRIVAL:  *Check in at the Main Entrance desk and let them know you are here for surgery.  *You will be directed to the 2nd floor surgical waiting area.    AFTER OUTPATIENT SURGERY:  *A responsible adult MUST accompany you at the time of discharge and stay with you for 24 hours after your surgery.  *You may NOT drive yourself home after surgery.  *You may use a taxi or ride sharing service ([employer], Uber) to return home ONLY if you are accompanied by a friend or family member.  *Instructions for  resuming your medications will be provided by your surgeon.

## 2024-09-09 NOTE — CPM/PAT NURSE NOTE
CPM/PAT Nurse Note      Name: Tiffanie Omer (Tiffanie Omer)  /Age: 1955/69 y.o.       Past Medical History:   Diagnosis Date    Adverse effect of anesthesia     disoriented    Anemia     Anxiety and depression     Hyperlipidemia     Migraines     Mitral valve prolapse     PONV (postoperative nausea and vomiting)     Tympanic membrane perforation, right     Plan: Right Side Postauricular Tympanoplasty, revision ossiculoplasty, Cartilage Graft 24       Past Surgical History:   Procedure Laterality Date    APPENDECTOMY      CHOLECYSTECTOMY      COSMETIC SURGERY      GALLBLADDER SURGERY      HYSTERECTOMY      MASTECTOMY, PARTIAL Left     MASTOID SURGERY      TONSILLECTOMY      TUBAL LIGATION      TYMPANOSTOMY TUBE PLACEMENT      Ear Pressure Equalization Tube       Patient Sexual activity questions deferred to the physician.    Family History   Problem Relation Name Age of Onset    COPD Mother      Asthma Mother      COPD Father      Hodgkin's lymphoma Brother         Allergies   Allergen Reactions    Amoxicillin-Pot Clavulanate Nausea/vomiting    Hydromorphone Other     Hypotension     Latex Rash    Nitroglycerin Other     Hypotension     Shellfish Derived Diarrhea and Nausea/vomiting    Sulfa (Sulfonamide Antibiotics) Other     Burning to tongue and throat        Prior to Admission medications    Medication Sig Start Date End Date Taking? Authorizing Provider   acetaminophen (Tylenol) 500 mg tablet Take 1 tablet (500 mg) by mouth if needed for mild pain (1 - 3).    Historical Provider, MD   aspirin-acetaminophen-caffeine (Excedrin Migraine) 250-250-65 mg tablet Take 1 tablet by mouth if needed for headaches.    Historical Provider, MD   biotin 1 mg capsule Take 1 capsule (1 mg) by mouth once daily.    Historical Provider, MD   COQ10, UBIQUINOL, ORAL Take 1 Dose by mouth once daily.    Historical Provider, MD   cyanocobalamin (Vitamin B-12) 2,500 mcg tablet Take 1,000 tablets (2,500,000 mcg) by mouth.     Historical Provider, MD   doxycycline (Monodox) 100 mg capsule Take 1 capsule (100 mg) by mouth 2 times a day for 7 days. Take with at least 8 ounces (large glass) of water, do not lie down for 30 minutes after  Patient not taking: Reported on 8/30/2024 8/28/24 9/4/24  Melissa Delgadillo MD   estradiol (Estrace) 2 mg tablet Take 1 tablet (2 mg) by mouth once daily. 10/12/22 3/15/25  Historical Provider, MD   multivitamin tablet Take 1 tablet by mouth once daily. PRENATAL    Historical Provider, MD   pravastatin (Pravachol) 20 mg tablet Take 1 tablet (20 mg) by mouth once daily. 6/14/16 3/15/25  Historical Provider, MD        PAT ROS     DASI Risk Score    No data to display       Caprini DVT Assessment    No data to display       Modified Frailty Index    No data to display       CHADS2 Stroke Risk  Current as of yesterday        N/A 3 to 100%: High Risk   2 to < 3%: Medium Risk   0 to < 2%: Low Risk     Last Change: N/A          This score determines the patient's risk of having a stroke if the patient has atrial fibrillation.        This score is not applicable to this patient. Components are not calculated.          Revised Cardiac Risk Index    No data to display       Apfel Simplified Score    No data to display       Risk Analysis Index Results This Encounter    No data found in the last 1 encounters.       After Visit Summary (AVS) reviewed and patient verbalized good understanding of medications and NPO instructions.     Nurse Plan of Action:

## 2024-09-09 NOTE — H&P (VIEW-ONLY)
[facility] Evaluation       Name: Tiffanie Omer (Tiffanie Omer)  /Age: 1955/69 y.o.       Date of Consult: 24    Referring Provider: Dr. Harden    Surgery, Date, and Length: Right Side Postauricular Tympanoplasty, revision ossiculoplasty, Cartilage Graft - Right , 24, 120MIN    Tiffanie Omer is a 69 year-old female who presents to the [facility] for perioperative risk assessment prior to surgery.    Patient presents with a primary diagnosis of chronic right suppurative otitis media with TM perforation.  She is s/p right-sided tympanoplasty with ossiculoplasty on 10/2/20. She has had blood-tinged otorrhea which began in 2023.  She has taken oral and otic antibiotics; both of which are now complete. Pt wishes to proceed with surgery as planned.     This note was created in part upon personal review of patient's medical records.      Patient is scheduled to have Right Side Postauricular Tympanoplasty, revision ossiculoplasty, Cartilage Graft - Right     Pt admits to PONV in the past, at time of eye lift / brow lift at [facility] in 2020 - no issues since.  Pt denies any past history of anesthetic complications such as PONV, awareness, prolonged sedation, dental damage, aspiration, cardiac arrest, difficult intubation, difficult I.V. access or unexpected hospital admissions.  NO malignant hyperthermia and or pseudocholinesterase deficiency.  No history of blood transfusions     The patient is not a [employer] and will accept blood and blood products if medically indicated.   Type and screen NOT sent.     Past Medical History:   Diagnosis Date    Adverse effect of anesthesia     disoriented    Anemia     Anxiety and depression     Hyperlipidemia     Migraines     Mitral valve prolapse     PONV (postoperative nausea and vomiting)     Tympanic membrane perforation, right     Plan: Right Side Postauricular Tympanoplasty, revision ossiculoplasty, Cartilage Graft 24       Past Surgical  History:   Procedure Laterality Date    APPENDECTOMY      CHOLECYSTECTOMY      COSMETIC SURGERY      GALLBLADDER SURGERY      HYSTERECTOMY      MASTECTOMY, PARTIAL Left     MASTOID SURGERY      TONSILLECTOMY      TUBAL LIGATION      TYMPANOSTOMY TUBE PLACEMENT      Ear Pressure Equalization Tube       Patient Sexual activity questions deferred to the physician.    Family History   Problem Relation Name Age of Onset    COPD Mother      Asthma Mother      COPD Father      Hodgkin's lymphoma Brother       Social History     Socioeconomic History    Marital status:      Spouse name: Not on file    Number of children: Not on file    Years of education: Not on file    Highest education level: Not on file   Occupational History    Not on file   Tobacco Use    Smoking status: Former     Current packs/day: 0.00     Average packs/day: 1 pack/day for 41.0 years (41.0 ttl pk-yrs)     Types: Cigarettes     Start date:      Quit date:      Years since quittin.6    Smokeless tobacco: Not on file    Tobacco comments:     Patient states she vapes    Vaping Use    Vaping status: Every Day    Start date: 2020    Substances: Nicotine    Devices: Disposable   Substance and Sexual Activity    Alcohol use: Not Currently    Drug use: Never    Sexual activity: Defer   Other Topics Concern    Not on file   Social History Narrative    Not on file     Social Determinants of Health     Financial Resource Strain: Not on file   Food Insecurity: Not on file   Transportation Needs: Not on file   Physical Activity: Not on file   Stress: Not on file   Social Connections: Not on file   Intimate Partner Violence: Not on file   Housing Stability: Not on file        Allergies   Allergen Reactions    Amoxicillin-Pot Clavulanate Nausea/vomiting    Hydromorphone Other     Hypotension     Latex Rash    Nitroglycerin Other     Hypotension     Shellfish Derived Diarrhea and Nausea/vomiting    Sulfa (Sulfonamide Antibiotics) Other      Burning to tongue and throat          Current Outpatient Medications:     acetaminophen (Tylenol) 500 mg tablet, Take 1 tablet (500 mg) by mouth if needed for mild pain (1 - 3)., Disp: , Rfl:     aspirin-acetaminophen-caffeine (Excedrin Migraine) 250-250-65 mg tablet, Take 1 tablet by mouth if needed for headaches., Disp: , Rfl:     biotin 1 mg capsule, Take 1 capsule (1 mg) by mouth once daily., Disp: , Rfl:     COQ10, UBIQUINOL, ORAL, Take 1 Dose by mouth once daily., Disp: , Rfl:     cyanocobalamin (Vitamin B-12) 2,500 mcg tablet, Take 1,000 tablets (2,500,000 mcg) by mouth., Disp: , Rfl:     estradiol (Estrace) 2 mg tablet, Take 1 tablet (2 mg) by mouth once daily., Disp: , Rfl:     multivitamin tablet, Take 1 tablet by mouth once daily. PRENATAL, Disp: , Rfl:     pravastatin (Pravachol) 20 mg tablet, Take 1 tablet (20 mg) by mouth once daily., Disp: , Rfl:          PAT ROS:   Constitutional:    no fever   no chills   no unexpected weight change  Neuro/Psych:    no numbness   no weakness   no light-headedness   no confusion  Eyes:    no discharge   no pain   no vision loss   no diplopia   no visual disturbance  Ears:    no ear pain   ear discharge (right ear)   hearing loss   no tinnitus   hearing aides  Nose:    no nasal discharge   no sinus congestion   no epistaxis  Mouth:    no dental issues   no mouth pain   no oral bleeding   no mouth lesions  Throat:    no throat pain   no dysphagia  Neck:    no neck pain   no neck stiffness  Cardio:    Functional 4 Mets. 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   no chest pain   no palpitations   no peripheral edema   no dyspnea   no HOLCOMB  Respiratory:    no cough   no wheezing   no hemoptysis   no shortness of breath  Endocrine:    no cold intolerance   no heat intolerance  GI:    no abdominal distention   no abdominal pain   no constipation   no diarrhea   no nausea   no vomiting   no  blood in stool  :    no difficulty urinating   no dysuria   no oliguria  Musculoskeletal:    arthralgias (b/l hands)   no myalgias   no decreased ROM  Hematologic:    does not bruise/bleed easily   no excessive bleeding   no history of blood transfusion   no blood clots  Skin:   no skin changes   no sores/wound   no rash      Physical Exam  Constitutional:       General: She is not in acute distress.     Appearance: Normal appearance. She is not ill-appearing, toxic-appearing or diaphoretic.   HENT:      Head: Normocephalic and atraumatic.      Comments: Perforated right TM     Right Ear: External ear normal.      Nose: Nose normal. No rhinorrhea.      Mouth/Throat:      Pharynx: No oropharyngeal exudate.   Eyes:      Extraocular Movements: Extraocular movements intact.      Conjunctiva/sclera: Conjunctivae normal.   Cardiovascular:      Rate and Rhythm: Normal rate and regular rhythm.      Heart sounds: No murmur heard.     No friction rub. No gallop.      Comments: Functional 4 Mets. Patient denies SOB walking up 2 flights of stairs     Pulmonary:      Effort: Pulmonary effort is normal. No respiratory distress.      Breath sounds: Normal breath sounds. No stridor. No wheezing or rhonchi.   Abdominal:      General: Bowel sounds are normal. There is no distension.      Palpations: Abdomen is soft. There is no mass.      Tenderness: There is no abdominal tenderness. There is no guarding or rebound.      Hernia: No hernia is present.   Musculoskeletal:         General: No swelling, tenderness, deformity or signs of injury. Normal range of motion.      Cervical back: Normal range of motion and neck supple. No rigidity or tenderness.   Skin:     General: Skin is warm and dry.      Coloration: Skin is not jaundiced or pale.      Findings: No bruising, erythema, lesion or rash.   Neurological:      General: No focal deficit present.      Mental Status: She is alert and oriented to person, place, and time.      Cranial  "Nerves: No cranial nerve deficit.      Sensory: No sensory deficit.      Motor: No weakness.      Coordination: Coordination normal.   Psychiatric:         Mood and Affect: Mood normal.         Behavior: Behavior normal.          PAT AIRWAY:   Airway:     Mallampati::  II    Neck ROM::  Full   No broken teeth, no dentures and no missing teeth          Visit Vitals  /58   Pulse 64   Temp 35.9 °C (96.6 °F)   Resp 16   Ht 1.75 m (5' 8.9\")   Wt 57.8 kg (127 lb 6.8 oz)   SpO2 98%   BMI 18.87 kg/m²   Smoking Status Former   BSA 1.68 m²      LABS:  Lab Results   Component Value Date    WBC 6.8 09/09/2024    HGB 12.6 09/09/2024    HCT 38.8 09/09/2024     09/09/2024     09/09/2024      Lab Results   Component Value Date    GLUCOSE 79 09/09/2024    CALCIUM 8.8 09/09/2024     09/09/2024    K 3.9 09/09/2024    CO2 29 09/09/2024     09/09/2024    BUN 10 09/09/2024    CREATININE 0.63 09/09/2024      Lab Results   Component Value Date    ALT 8 09/09/2024    AST 13 09/09/2024    ALKPHOS 54 09/09/2024    BILITOT 0.4 09/09/2024        Assessment and Plan:     Patient is a 69-year-old female scheduled for a Right Side Postauricular Tympanoplasty, revision ossiculoplasty, Cartilage Graft - Right  with Dr. Harden on 9/23/24.    Patient has no active cardiac symptoms.   Patient denies any chest pain, tightness, heaviness, pressure, radiating pain, palpitations, irregular heartbeats, lightheadedness, cough, congestion, shortness of breath, HOLCOMB, PND, near syncope, weight loss or gain.     RCRI  0  , 3.9 % Risk of MACE    Cardiac:  HLD - cont statin on dos     MVP - no murmur on exam    Hematology:  Anemia - lab results show H/H wnl today    Patient instructed to ambulate as soon as possible postoperatively to decrease thromboembolic risk.   Initiate mechanical DVT prophylaxis as soon as possible and initiate chemical prophylaxis when deemed safe from a bleeding standpoint post surgery.     LABS: CBC, CMP " ordered. Lab results reviewed and unremarkable.     STOP BANG: >50 = 1    Caprini: 4    Risk assessment complete.  Patient is scheduled for a low to intermediate surgical risk procedure.        Preoperative medication instructions were provided and reviewed with the patient.  Any additional testing or evaluation was explained to the patient.  Nothing by mouth instructions were discussed and patient's questions were answered prior to conclusion to this encounter.  Patient verbalized understanding of preoperative instructions given in preadmission testing; discharge instructions available in EMR.    This note was dictated by a speech recognition.  Minor errors may have been detected in a speech recognition.

## 2024-09-23 ENCOUNTER — ANESTHESIA (OUTPATIENT)
Dept: OPERATING ROOM | Facility: HOSPITAL | Age: 69
End: 2024-09-23
Payer: MEDICARE

## 2024-09-23 ENCOUNTER — HOSPITAL ENCOUNTER (OUTPATIENT)
Facility: HOSPITAL | Age: 69
Setting detail: OUTPATIENT SURGERY
Discharge: HOME | End: 2024-09-23
Attending: OTOLARYNGOLOGY | Admitting: OTOLARYNGOLOGY
Payer: MEDICARE

## 2024-09-23 ENCOUNTER — ANESTHESIA EVENT (OUTPATIENT)
Dept: OPERATING ROOM | Facility: HOSPITAL | Age: 69
End: 2024-09-23
Payer: MEDICARE

## 2024-09-23 VITALS
SYSTOLIC BLOOD PRESSURE: 106 MMHG | BODY MASS INDEX: 18.05 KG/M2 | HEIGHT: 70 IN | DIASTOLIC BLOOD PRESSURE: 59 MMHG | OXYGEN SATURATION: 98 % | WEIGHT: 126.1 LBS | HEART RATE: 82 BPM | RESPIRATION RATE: 12 BRPM | TEMPERATURE: 97 F

## 2024-09-23 DIAGNOSIS — H72.91 TYMPANIC MEMBRANE PERFORATION, RIGHT: ICD-10-CM

## 2024-09-23 DIAGNOSIS — H71.92 CHOLESTEATOMA OF LEFT EAR: Primary | ICD-10-CM

## 2024-09-23 PROBLEM — F05 POSTOPERATIVE DELIRIUM: Status: ACTIVE | Noted: 2024-09-23

## 2024-09-23 PROBLEM — R11.2 PONV (POSTOPERATIVE NAUSEA AND VOMITING): Status: ACTIVE | Noted: 2024-09-23

## 2024-09-23 PROBLEM — Z98.890 PONV (POSTOPERATIVE NAUSEA AND VOMITING): Status: ACTIVE | Noted: 2024-09-23

## 2024-09-23 PROCEDURE — 2500000001 HC RX 250 WO HCPCS SELF ADMINISTERED DRUGS (ALT 637 FOR MEDICARE OP): Performed by: ANESTHESIOLOGY

## 2024-09-23 PROCEDURE — 7100000002 HC RECOVERY ROOM TIME - EACH INCREMENTAL 1 MINUTE: Performed by: OTOLARYNGOLOGY

## 2024-09-23 PROCEDURE — A69633 PR TYMPANOPLASTY,REBLD OSSIC CHAIN+PROS: Performed by: STUDENT IN AN ORGANIZED HEALTH CARE EDUCATION/TRAINING PROGRAM

## 2024-09-23 PROCEDURE — 3700000001 HC GENERAL ANESTHESIA TIME - INITIAL BASE CHARGE: Performed by: OTOLARYNGOLOGY

## 2024-09-23 PROCEDURE — 2500000005 HC RX 250 GENERAL PHARMACY W/O HCPCS: Performed by: REGISTERED NURSE

## 2024-09-23 PROCEDURE — 3600000008 HC OR TIME - EACH INCREMENTAL 1 MINUTE - PROCEDURE LEVEL THREE: Performed by: OTOLARYNGOLOGY

## 2024-09-23 PROCEDURE — 69145 REMOVE EAR CANAL LESION(S): CPT | Performed by: OTOLARYNGOLOGY

## 2024-09-23 PROCEDURE — 7100000009 HC PHASE TWO TIME - INITIAL BASE CHARGE: Performed by: OTOLARYNGOLOGY

## 2024-09-23 PROCEDURE — 3700000002 HC GENERAL ANESTHESIA TIME - EACH INCREMENTAL 1 MINUTE: Performed by: OTOLARYNGOLOGY

## 2024-09-23 PROCEDURE — 2500000004 HC RX 250 GENERAL PHARMACY W/ HCPCS (ALT 636 FOR OP/ED): Performed by: OTOLARYNGOLOGY

## 2024-09-23 PROCEDURE — 7100000010 HC PHASE TWO TIME - EACH INCREMENTAL 1 MINUTE: Performed by: OTOLARYNGOLOGY

## 2024-09-23 PROCEDURE — 2500000004 HC RX 250 GENERAL PHARMACY W/ HCPCS (ALT 636 FOR OP/ED): Performed by: ANESTHESIOLOGIST ASSISTANT

## 2024-09-23 PROCEDURE — 2500000005 HC RX 250 GENERAL PHARMACY W/O HCPCS: Performed by: STUDENT IN AN ORGANIZED HEALTH CARE EDUCATION/TRAINING PROGRAM

## 2024-09-23 PROCEDURE — 2720000007 HC OR 272 NO HCPCS: Performed by: OTOLARYNGOLOGY

## 2024-09-23 PROCEDURE — 2500000004 HC RX 250 GENERAL PHARMACY W/ HCPCS (ALT 636 FOR OP/ED): Performed by: NURSE ANESTHETIST, CERTIFIED REGISTERED

## 2024-09-23 PROCEDURE — 7100000001 HC RECOVERY ROOM TIME - INITIAL BASE CHARGE: Performed by: OTOLARYNGOLOGY

## 2024-09-23 PROCEDURE — 2500000002 HC RX 250 W HCPCS SELF ADMINISTERED DRUGS (ALT 637 FOR MEDICARE OP, ALT 636 FOR OP/ED): Performed by: OTOLARYNGOLOGY

## 2024-09-23 PROCEDURE — 2780000003 HC OR 278 NO HCPCS: Performed by: OTOLARYNGOLOGY

## 2024-09-23 PROCEDURE — 69633 REBUILD EARDRUM STRUCTURES: CPT | Performed by: OTOLARYNGOLOGY

## 2024-09-23 PROCEDURE — 2500000004 HC RX 250 GENERAL PHARMACY W/ HCPCS (ALT 636 FOR OP/ED): Performed by: REGISTERED NURSE

## 2024-09-23 PROCEDURE — 88304 TISSUE EXAM BY PATHOLOGIST: CPT | Mod: TC,AHULAB,WESLAB | Performed by: OTOLARYNGOLOGY

## 2024-09-23 PROCEDURE — 2500000005 HC RX 250 GENERAL PHARMACY W/O HCPCS: Performed by: ANESTHESIOLOGIST ASSISTANT

## 2024-09-23 PROCEDURE — 3600000003 HC OR TIME - INITIAL BASE CHARGE - PROCEDURE LEVEL THREE: Performed by: OTOLARYNGOLOGY

## 2024-09-23 PROCEDURE — 2500000005 HC RX 250 GENERAL PHARMACY W/O HCPCS: Performed by: OTOLARYNGOLOGY

## 2024-09-23 DEVICE — CENTERED ALTO CONCISE PARTIAL SIZERS INCLUDED TITANIUM/SILICONE
Type: IMPLANTABLE DEVICE | Site: EAR | Status: FUNCTIONAL
Brand: CENTERED ALTO CONCISE PARTIAL

## 2024-09-23 RX ORDER — TRAMADOL HYDROCHLORIDE 50 MG/1
50 TABLET ORAL EVERY 8 HOURS PRN
Qty: 15 TABLET | Refills: 0 | Status: SHIPPED | OUTPATIENT
Start: 2024-09-23 | End: 2024-09-28

## 2024-09-23 RX ORDER — LABETALOL HYDROCHLORIDE 5 MG/ML
5 INJECTION, SOLUTION INTRAVENOUS ONCE AS NEEDED
Status: DISCONTINUED | OUTPATIENT
Start: 2024-09-23 | End: 2024-09-23 | Stop reason: HOSPADM

## 2024-09-23 RX ORDER — FENTANYL CITRATE 50 UG/ML
50 INJECTION, SOLUTION INTRAMUSCULAR; INTRAVENOUS EVERY 5 MIN PRN
Status: DISCONTINUED | OUTPATIENT
Start: 2024-09-23 | End: 2024-09-23 | Stop reason: HOSPADM

## 2024-09-23 RX ORDER — FENTANYL CITRATE 50 UG/ML
INJECTION, SOLUTION INTRAMUSCULAR; INTRAVENOUS AS NEEDED
Status: DISCONTINUED | OUTPATIENT
Start: 2024-09-23 | End: 2024-09-23

## 2024-09-23 RX ORDER — PHENYLEPHRINE HCL IN 0.9% NACL 1 MG/10 ML
SYRINGE (ML) INTRAVENOUS AS NEEDED
Status: DISCONTINUED | OUTPATIENT
Start: 2024-09-23 | End: 2024-09-23

## 2024-09-23 RX ORDER — CIPROFLOXACIN AND DEXAMETHASONE 3; 1 MG/ML; MG/ML
4 SUSPENSION/ DROPS AURICULAR (OTIC) 2 TIMES DAILY
Qty: 7.5 ML | Refills: 0 | Status: SHIPPED | OUTPATIENT
Start: 2024-09-23

## 2024-09-23 RX ORDER — OXYCODONE HYDROCHLORIDE 5 MG/1
5 TABLET ORAL EVERY 4 HOURS PRN
Status: COMPLETED | OUTPATIENT
Start: 2024-09-23 | End: 2024-09-23

## 2024-09-23 RX ORDER — SODIUM CHLORIDE, SODIUM LACTATE, POTASSIUM CHLORIDE, CALCIUM CHLORIDE 600; 310; 30; 20 MG/100ML; MG/100ML; MG/100ML; MG/100ML
100 INJECTION, SOLUTION INTRAVENOUS CONTINUOUS
Status: DISCONTINUED | OUTPATIENT
Start: 2024-09-23 | End: 2024-09-23 | Stop reason: HOSPADM

## 2024-09-23 RX ORDER — LIDOCAINE HYDROCHLORIDE AND EPINEPHRINE 10; 10 MG/ML; UG/ML
INJECTION, SOLUTION INFILTRATION; PERINEURAL AS NEEDED
Status: DISCONTINUED | OUTPATIENT
Start: 2024-09-23 | End: 2024-09-23 | Stop reason: HOSPADM

## 2024-09-23 RX ORDER — ONDANSETRON HYDROCHLORIDE 2 MG/ML
4 INJECTION, SOLUTION INTRAVENOUS ONCE AS NEEDED
Status: DISCONTINUED | OUTPATIENT
Start: 2024-09-23 | End: 2024-09-23 | Stop reason: HOSPADM

## 2024-09-23 RX ORDER — CIPROFLOXACIN AND DEXAMETHASONE 3; 1 MG/ML; MG/ML
SUSPENSION/ DROPS AURICULAR (OTIC) AS NEEDED
Status: DISCONTINUED | OUTPATIENT
Start: 2024-09-23 | End: 2024-09-23 | Stop reason: HOSPADM

## 2024-09-23 RX ORDER — ONDANSETRON HYDROCHLORIDE 2 MG/ML
INJECTION, SOLUTION INTRAVENOUS AS NEEDED
Status: DISCONTINUED | OUTPATIENT
Start: 2024-09-23 | End: 2024-09-23

## 2024-09-23 RX ORDER — POLYETHYLENE GLYCOL 3350 17 G/17G
17 POWDER, FOR SOLUTION ORAL DAILY
Qty: 5 PACKET | Refills: 0 | Status: SHIPPED | OUTPATIENT
Start: 2024-09-23 | End: 2024-09-28

## 2024-09-23 RX ORDER — NORETHINDRONE AND ETHINYL ESTRADIOL 0.5-0.035
KIT ORAL AS NEEDED
Status: DISCONTINUED | OUTPATIENT
Start: 2024-09-23 | End: 2024-09-23

## 2024-09-23 RX ORDER — FENTANYL CITRATE 50 UG/ML
25 INJECTION, SOLUTION INTRAMUSCULAR; INTRAVENOUS EVERY 5 MIN PRN
Status: DISCONTINUED | OUTPATIENT
Start: 2024-09-23 | End: 2024-09-23 | Stop reason: HOSPADM

## 2024-09-23 RX ORDER — ONDANSETRON 4 MG/1
4 TABLET, FILM COATED ORAL EVERY 8 HOURS PRN
Qty: 15 TABLET | Refills: 0 | Status: SHIPPED | OUTPATIENT
Start: 2024-09-23

## 2024-09-23 RX ORDER — ALBUTEROL SULFATE 0.83 MG/ML
2.5 SOLUTION RESPIRATORY (INHALATION) ONCE AS NEEDED
Status: DISCONTINUED | OUTPATIENT
Start: 2024-09-23 | End: 2024-09-23 | Stop reason: HOSPADM

## 2024-09-23 RX ORDER — MIDAZOLAM HYDROCHLORIDE 1 MG/ML
INJECTION INTRAMUSCULAR; INTRAVENOUS AS NEEDED
Status: DISCONTINUED | OUTPATIENT
Start: 2024-09-23 | End: 2024-09-23

## 2024-09-23 RX ORDER — SUCCINYLCHOLINE CHLORIDE 20 MG/ML
INJECTION INTRAMUSCULAR; INTRAVENOUS AS NEEDED
Status: DISCONTINUED | OUTPATIENT
Start: 2024-09-23 | End: 2024-09-23

## 2024-09-23 RX ORDER — SODIUM CHLORIDE 0.9 G/100ML
IRRIGANT IRRIGATION AS NEEDED
Status: DISCONTINUED | OUTPATIENT
Start: 2024-09-23 | End: 2024-09-23 | Stop reason: HOSPADM

## 2024-09-23 RX ORDER — CEPHALEXIN 500 MG/1
500 CAPSULE ORAL 3 TIMES DAILY
Qty: 21 CAPSULE | Refills: 0 | Status: SHIPPED | OUTPATIENT
Start: 2024-09-23 | End: 2024-09-30

## 2024-09-23 RX ORDER — SODIUM CHLORIDE, SODIUM LACTATE, POTASSIUM CHLORIDE, CALCIUM CHLORIDE 600; 310; 30; 20 MG/100ML; MG/100ML; MG/100ML; MG/100ML
INJECTION, SOLUTION INTRAVENOUS CONTINUOUS PRN
Status: DISCONTINUED | OUTPATIENT
Start: 2024-09-23 | End: 2024-09-23

## 2024-09-23 RX ORDER — FENTANYL CITRATE 50 UG/ML
12.5 INJECTION, SOLUTION INTRAMUSCULAR; INTRAVENOUS EVERY 5 MIN PRN
Status: DISCONTINUED | OUTPATIENT
Start: 2024-09-23 | End: 2024-09-23 | Stop reason: HOSPADM

## 2024-09-23 RX ORDER — CEFAZOLIN 1 G/1
INJECTION, POWDER, FOR SOLUTION INTRAVENOUS AS NEEDED
Status: DISCONTINUED | OUTPATIENT
Start: 2024-09-23 | End: 2024-09-23

## 2024-09-23 RX ORDER — LIDOCAINE HYDROCHLORIDE 10 MG/ML
0.1 INJECTION, SOLUTION EPIDURAL; INFILTRATION; INTRACAUDAL; PERINEURAL ONCE
Status: DISCONTINUED | OUTPATIENT
Start: 2024-09-23 | End: 2024-09-23 | Stop reason: HOSPADM

## 2024-09-23 RX ORDER — PROPOFOL 10 MG/ML
INJECTION, EMULSION INTRAVENOUS AS NEEDED
Status: DISCONTINUED | OUTPATIENT
Start: 2024-09-23 | End: 2024-09-23

## 2024-09-23 RX ORDER — APREPITANT 40 MG/1
40 CAPSULE ORAL DAILY
Status: DISCONTINUED | OUTPATIENT
Start: 2024-09-23 | End: 2024-09-23 | Stop reason: HOSPADM

## 2024-09-23 RX ORDER — DROPERIDOL 2.5 MG/ML
0.62 INJECTION, SOLUTION INTRAMUSCULAR; INTRAVENOUS ONCE AS NEEDED
Status: DISCONTINUED | OUTPATIENT
Start: 2024-09-23 | End: 2024-09-23 | Stop reason: HOSPADM

## 2024-09-23 RX ORDER — LIDOCAINE HYDROCHLORIDE 20 MG/ML
INJECTION, SOLUTION INFILTRATION; PERINEURAL AS NEEDED
Status: DISCONTINUED | OUTPATIENT
Start: 2024-09-23 | End: 2024-09-23

## 2024-09-23 ASSESSMENT — PAIN - FUNCTIONAL ASSESSMENT
PAIN_FUNCTIONAL_ASSESSMENT: 0-10

## 2024-09-23 ASSESSMENT — PAIN SCALES - GENERAL
PAINLEVEL_OUTOF10: 3
PAINLEVEL_OUTOF10: 0 - NO PAIN
PAINLEVEL_OUTOF10: 3
PAINLEVEL_OUTOF10: 3
PAINLEVEL_OUTOF10: 0 - NO PAIN

## 2024-09-23 ASSESSMENT — COLUMBIA-SUICIDE SEVERITY RATING SCALE - C-SSRS
6. HAVE YOU EVER DONE ANYTHING, STARTED TO DO ANYTHING, OR PREPARED TO DO ANYTHING TO END YOUR LIFE?: NO
1. IN THE PAST MONTH, HAVE YOU WISHED YOU WERE DEAD OR WISHED YOU COULD GO TO SLEEP AND NOT WAKE UP?: NO
2. HAVE YOU ACTUALLY HAD ANY THOUGHTS OF KILLING YOURSELF?: NO

## 2024-09-23 NOTE — ANESTHESIA PREPROCEDURE EVALUATION
Patient: Tiffanie Omer    Procedure Information       Date/Time: 09/23/24 1300    Procedure: Right Side Postauricular Tympanoplasty; Revision Ossiculoplasty; Cartilage Graft (Right) - CASE LENGTH 2.5H    Location: [facility] OR 09 / [facility] OR    Surgeons: Rosana Harden MD            Relevant Problems   Anesthesia   (+) PONV (postoperative nausea and vomiting)   (+) Postoperative delirium      Cardiac (within normal limits)      Pulmonary (within normal limits)      Neuro (within normal limits)      GI (within normal limits)      Liver (within normal limits)      Endocrine (within normal limits)      ID   (+) Chronic tubotympanic suppurative otitis media of right ear       Clinical information reviewed:   Tobacco  Allergies    Med Hx  Surg Hx   Fam Hx  Soc Hx        NPO Detail:  NPO/Void Status  Carbohydrate Drink Given Prior to Surgery? : N  Date of Last Liquid: 09/23/24  Time of Last Liquid: 0900  Date of Last Solid: 09/22/24  Time of Last Solid: 2130  Last Intake Type: Clear fluids  Time of Last Void: 1000         Physical Exam    Airway  Mallampati: II  TM distance: >3 FB  Neck ROM: full     Cardiovascular - normal exam     Dental - normal exam     Pulmonary - normal exam     Abdominal - normal exam             Anesthesia Plan    History of general anesthesia?: yes  History of complications of general anesthesia?: no    ASA 2     general     intravenous induction   Postoperative administration of opioids is intended.  Trial extubation is planned.  Anesthetic plan and risks discussed with patient.  Use of blood products discussed with patient who.    Plan discussed with CAA.

## 2024-09-23 NOTE — ANESTHESIA POSTPROCEDURE EVALUATION
Patient: Tiffanie Omer    Procedure Summary       Date: 09/23/24 Room / Location: [facility] OR 09 / [facility]    Anesthesia Start: 1335 Anesthesia Stop: 1647    Procedure: Right Side Postauricular Tympanoplasty; Revision Ossiculoplasty; Removed Cholesteoma (Right) Diagnosis:       Tympanic membrane perforation, right      (Tympanic membrane perforation, right [H72.91])    Surgeons: Rosana Harden MD Responsible Provider: Tristin Contreras MD    Anesthesia Type: general ASA Status: 2            Anesthesia Type: general    Vitals Value Taken Time   /63 09/23/24 1734   Temp 36.5 °C (97.7 °F) 09/23/24 1641   Pulse 83 09/23/24 1735   Resp 12 09/23/24 1730   SpO2 98 % 09/23/24 1735   Vitals shown include unfiled device data.    Anesthesia Post Evaluation    Patient location during evaluation: PACU  Patient participation: complete - patient participated  Level of consciousness: awake  Pain management: adequate  Airway patency: patent  Cardiovascular status: acceptable  Respiratory status: acceptable  Hydration status: acceptable  Postoperative Nausea and Vomiting: none        No notable events documented.

## 2024-09-23 NOTE — ANESTHESIA PROCEDURE NOTES
Airway  Date/Time: 9/23/2024 1:46 PM  Urgency: elective    Airway not difficult    Staffing  Performed: LUPE   Authorized by: Robby Tran MD    Performed by: DENISE Sanchez  Patient location during procedure: OR    Indications and Patient Condition  Indications for airway management: anesthesia and airway protection  Spontaneous Ventilation: absent  Sedation level: deep  Preoxygenated: yes  Patient position: sniffing  MILS maintained throughout  Mask difficulty assessment: 1 - vent by mask    Final Airway Details  Final airway type: endotracheal airway      Successful airway: ETT  Cuffed: yes   Successful intubation technique: direct laryngoscopy  Facilitating devices/methods: intubating stylet  Endotracheal tube insertion site: oral  Blade: Olga  Blade size: #3  ETT size (mm): 7.0  Cormack-Lehane Classification: grade I - full view of glottis  Placement verified by: chest auscultation   Cuff volume (mL): 5  Measured from: lips  ETT to lips (cm): 21  Number of attempts at approach: 1

## 2024-09-23 NOTE — OP NOTE
Right Side Postauricular Tympanoplasty; Revision Ossiculoplasty; Removed Cholesteoma (R) Operative Note     Date: 2024  OR Location: [facility] OR    Name: Tiffanie Omer, : 1955, Age: 69 y.o., MRN: [id number], Sex: female    Diagnosis  Pre-op Diagnosis      * Tympanic membrane perforation, right [H72.91] Post-op Diagnosis     * Tympanic membrane perforation, right [H72.91]     Procedures  Right Side Postauricular Tympanoplasty; Revision Ossiculoplasty; Removed Cholesteoma  33045 - VA TYMPANOPLASTY W/O MASTOIDECT W/O OSSICLE RECNSTJ    VA GRAFT EAR CRTLG AUTOGENOUS NOSE/EAR [81324]  VA TYMPANOPLASTY W/O MASTOIDEC 1ST/REVJ PROSTH TORP [[id number]]  Surgeons      * Rosana Harden - Primary    Resident/Fellow/Other Assistant:  Surgeons and Role:  * No surgeons found with a matching role *    Procedure Summary  Anesthesia: General  ASA: II  Anesthesia Staff: Anesthesiologist: Tristin Contrersa MD; Robby Tran MD  CRNA: FILIBERTO CalleCRNA, DNAP; JUANITA Gayle-CRNA  C-AA: DENISE Sanchez  Estimated Blood Loss: 5mL  Intra-op Medications:   Administrations occurring from 1300 to 1530 on 24:   Medication Name Total Dose   sodium chloride 0.9 % irrigation solution 4,000 mL   lidocaine-epinephrine (Xylocaine W/EPI) 1 %-1:100,000 injection 5 mL   EPINEPHrine (Adrenalin) 1 mL in sodium chloride 0.9% 19 mL syringe Cannot be calculated              Anesthesia Record               Intraprocedure I/O Totals          Intake    LR infusion 1000.00 mL    Total Intake 1000 mL       Output    Urine 0 mL    Est. Blood Loss 5 mL    Total Output 5 mL       Net    Net Volume 995 mL          Specimen:   ID Type Source Tests Collected by Time   1 : MIDDLE EAR CONTENT Tissue EAR, MIDDLE EAR CONTENTS RIGHT SURGICAL PATHOLOGY EXAM Rosana Harden MD 2024 1458        Staff:   Circulator: Brenda Davalos Person: Linsey  Circulator: Bree Ashby Scrub: Vianca Ashby Circulator: Darby  Circulator:  Mando      Implants:  Implants       Type Name Action Serial No.      Cochlear Implant ALTO, CENTERED, PARTIAL, CONCISE - [id number] Implanted               Findings: Cholesteatoma was identified in the middle ear encasing the stapes and encroaching on the backside of the malleus.  This appeared to emanate from the perforation.  Malleus was sacrificed.  Remnant of the incus was also removed.  Stapes was intact and mobile.  A Kim centered alto concise prosthesis measured at 1.5 mm was positioned in the middle ear    Indications: Tiffanie Omer is an 69 y.o. female who is having surgery for Tympanic membrane perforation, right [H72.91].  She has a longstanding history of bilateral chronic otitis media.  She had a left tympanomastoidectomy for cholesteatoma with Dr. Turner  in 2014.  I performed a right sided tympanoplasty and atticotomy for her for an early cholesteatoma in 2020.  She presented recently after going swimming in the ocean with the copious drainage from the right ear which we were unable to control with oral and topical antibiotics.  CT scan did demonstrate soft tissue in the middle ear space but the mastoid was clear.    The patient was seen in the preoperative area. The risks, benefits, complications, treatment options, non-operative alternatives, expected recovery and outcomes were discussed with the patient. The possibilities of reaction to medication, pulmonary aspiration, injury to surrounding structures, bleeding, recurrent infection, the need for additional procedures, failure to diagnose a condition, and creating a complication requiring transfusion or operation were discussed with the patient. The patient concurred with the proposed plan, giving informed consent.  The site of surgery was properly noted/marked if necessary per policy. The patient has been actively warmed in preoperative area. Preoperative antibiotics have been ordered and given within 1 hours of incision. Venous thrombosis  prophylaxis have been ordered including bilateral sequential compression devices    Procedure Details: After receiving informed consent from the patient including a discussion of the risk benefits and alternatives she was taken to the operating room placed on the operating room table.  She was induced into general anesthesia.  Facial nerve electrodes were placed in the orbicularis oculi navicularis oris muscle placement was confirmed on the facial monitor and continuous facial nerve monitoring was then performed for the rest the case which lasted approximately 2-1/2 hours.  The bed was then turned 180 degrees.  The postauricular incision was infiltrated with 1% lidocaine with 1-100,000 epinephrine 5 cc was used.  The area was then prepped and draped in the usual sterile fashion.  Of note baby shampoo was used as she has not shellfish allergy to prevent the risk of iodine cross-reactivity.  The ear canal was then examined there was a anterior mid drum perforation which appeared to spare the annulus with thickening of the drum and thick mucopurulent material suctioned from the middle ear space.  The area of the ear canal was then infiltrated with 1-20,000 epinephrine and four-quadrant injection.  A vascular strip was then incised and back elevated.  Postauricular incision was then created elevated down to the periosteum.  A palva flap was created and the ear was reflected forward.  The ear canal was entered and the incisions for the vascular strip were identified the vascular strip was then turned inside out and placed into a Raza retractor.  Next the tympanomeatal flap was elevated and the middle ear was entered.  The previous atticotomy was intact without skin tracking here however on entering the middle ear it was apparent that there was skin around the stapes.  The previously positioned ossicular prosthesis was dissected free from the drum.  There was skin on the anterior side of the stapes as well as on the  backside of the malleus handle tracking towards the attic but without involving the attic.  There was a significant amount of skin within the neotympanum as well.  This was all resected leaving a somewhat larger defect in the drum to be able to remove all of the involved area.  At this point I elected to sacrifice the malleus in order to make sure that the cholesteatoma was completely removed.  Thus the tensor tympani was cut and the anterior mallear ligament was also severed this allowed the malleus to be removed.  There was remnants of the incus, mostly just the body, that was also removed through the ear canal.  At this point I then turned attention towards dissecting the cholesteatoma off of the stapes.  The stapedial tendon was intact thus allowing me to gently dissect the matrix free from the crura.  The stapes was intact at the end of this dissection.  The facial nerve was bony covered.  Once the skin had been cleared from middle ear the area was copiously irrigated.  A large piece of temporalis fascia was then harvested and pressed.  The middle ear was filled with Gelfoam impregnated with Ciprodex.  PORP prosthesis was sized to 1.5 mm. a Kim centered alto concise prosthesis was measured to 1.5 mm and then cut.  This was then positioned over the capitulum of the stapes after placing the fascia in the middle ear to cover the defects in the drum which resulted from cholesteatoma removal.  The graft and flap were laid back down the lateral part of the drum was then packed with Gelfoam and Ciprodex the vascular strip was returned to its position.  The ear canal was then filled with Gelfoam.  The postauricular incision was then closed in layers.  The periosteum was closed with interrupted 3-0 Vicryl.  The deep dermals were done with 3-0 Vicryl.  The skin was closed with a running 5-0 fast gut.  Facial nerve electrodes were removed she was then returned to the care of anesthesia.  She was extubated without  incident and transferred to recovery room in stable condition.  Complications:  None; patient tolerated the procedure well.    Disposition: PACU - hemodynamically stable.  Condition: stable         Additional Details:     Attending Attestation: I was present and scrubbed for the key portions of the procedure.    Rosana Harden  Phone Number: [phone]

## 2024-09-23 NOTE — NURSING NOTE
1655: Handoff received from Louann RN, assumed care for patient at this time    1659: Patient tolerating sips of ginger ale and esteban crackers at this time    1707: Drop of blood rolled down patient neck from underneath dressing. MD Smith messaged via secure chat. Per MD some bleeding is to be expected, no new orders at this time.    1710: MD Contreras called via Peela in regard to pain medication orders. MD to place oxycodone order.    1722: 5mg oxycodone administered per given orders, allergies verified prior to administration    1739: Patient meets phase one discharge criteria    1740: Patient to phase two at this time. Patient ambulated to bathroom with RN assistance at this time. Unmeasured void occurrence.    1743: Patient dressed with RN assistance.     1745: Family at bedside    1750: Discharge instructions reviewed with patient and family, no further questions at this time.     1800: Peripheral IV removed with no complications.     1802: Patient to main lobby via transport with all belongings in stable condition. Phase 2 complete.

## 2024-09-24 ASSESSMENT — PAIN SCALES - GENERAL: PAINLEVEL_OUTOF10: 8

## 2024-09-24 NOTE — SIGNIFICANT EVENT
Patient states that her pain is severe and uncontrolled with prescribed pain medication. I instructed the patient to reach out to the surgeons office to let them know, and I will send a message to the surgeon as well to hopefully speed up the process.

## 2024-09-25 ENCOUNTER — TELEPHONE (OUTPATIENT)
Dept: OTOLARYNGOLOGY | Facility: CLINIC | Age: 69
End: 2024-09-25
Payer: MEDICARE

## 2024-09-25 NOTE — TELEPHONE ENCOUNTER
Called patient regarding post op pain issues yesterday. She states that her pain is well controlled with tylenol, tramadol and ibuprofen on board. All of her prescriptions were received and post op confirmed. She states that if there is an opening at [place] to be seen sooner she would prefer that location. She has no other questions or concerns at this time.

## 2024-09-30 LAB
LABORATORY COMMENT REPORT: NORMAL
PATH REPORT.FINAL DX SPEC: NORMAL
PATH REPORT.GROSS SPEC: NORMAL
PATH REPORT.RELEVANT HX SPEC: NORMAL
PATH REPORT.TOTAL CANCER: NORMAL

## 2024-10-07 NOTE — PROGRESS NOTES
History Of Present Illness:  Tiffanie Omer is a 69 y.o. female with a history of left cholesteatoma and right chronic suppurative otitis media with TM perforation, she is s/p left-sided postauricular CWU mastoidectomy with cholesteatoma resection on 2/7/14 with Dr. Turner, and right-sided tympanoplasty with ossiculoplasty on 10/2/20, now s/p right side postauricular tympanoplasty, revision ossiculoplasty, and right cholesteatoma removal on 9/23/24. She presents today for a postoperative visit.     Doing well today - minimal issues after surgery, post auricular incision was sore.     Recall 8/12/24:  Tiffanie Omer is a 69 y.o. female with a history of left cholesteatoma and right chronic suppurative otitis media with TM perforation, she is s/p left-sided postauricular CWU mastoidectomy with cholesteatoma resection on 2/7/14 with Dr. Turner, and right-sided tympanoplasty with ossiculoplasty on 10/2/20. She presents today to discuss her CT results from 7/30/24.      Since her last visit, she has had an improvement in her symptoms of drainage. She has finished her course of doxycycline 100 mg and has not experienced drainage since.       She is currently scheduled for right side postauricular tympanoplasty with cartilage graft on 9/23/24.     Recall 7/23/24:  Tiffanie Omer is a 69 y.o. female with a history of left cholesteatoma and right chronic suppurative otitis media with TM perforation, she is s/p left-sided postauricular CWU mastoidectomy with cholesteatoma resection on 2/7/14 with Dr. Turner, and right-sided tympanoplasty with ossiculoplasty on 10/2/20. She presents today for a 4 week follow-up for blood-tinged otorrhea after a course of oral and topical antibiotics.     Since her last visit, she had a brief improvement in her symptoms of drainage. When she stopped the oral antibiotics and topical antibiotics, her symptoms recurred several days ago. She denies pain but says she has had continued  drainage from the right ear. No other new otologic symptoms.    Surgical History:  She has a past surgical history that includes Tympanostomy tube placement; Tonsillectomy; Hysterectomy; Gallbladder surgery; Mastoid surgery; Mastectomy, partial (Left); Appendectomy; Tubal ligation; Cosmetic surgery; Cholecystectomy; and Tympanoplasty (Right, 09/23/2024).     Allergies:  Amoxicillin-pot clavulanate, Hydromorphone, Latex, Nitroglycerin, Shellfish derived, and Sulfa (sulfonamide antibiotics)    Medications:   Current Outpatient Medications   Medication Instructions    acetaminophen (TYLENOL) 500 mg, oral, As needed    aspirin-acetaminophen-caffeine (Excedrin Migraine) 250-250-65 mg tablet 1 tablet, oral, As needed    biotin 1 mg capsule 1 capsule, oral, Daily    ciprofloxacin-dexamethasone (CiproDEX) otic suspension 4 drops, otic (ear), 2 times daily, Apply to surgical ear starting two days after surgery until follow up    COQ10, UBIQUINOL, ORAL 1 Dose, oral, Daily    cyanocobalamin (VITAMIN B-12) 2,500 mg, oral    estradiol (ESTRACE) 2 mg, oral, Daily RT    multivitamin tablet 1 tablet, oral, Daily, PRENATAL    ondansetron (ZOFRAN) 4 mg, oral, Every 8 hours PRN    pravastatin (PRAVACHOL) 20 mg, oral, Daily RT      Review of Systems:   A comprehensive 10-point review of systems was obtained including constitutional, neurological, HEENT, pulmonary, cardiovascular, genito-urinary, and other pertinent systems and was negative except as noted in the HPI.      Physical Exam:  Constitutional   General appearance: Healthy-appearing, well-nourished, well groomed, in no acute distress.   Ability to communicate: Normal communication without aids, normal voice quality.       Head and face: Atraumatic with no masses, lesions, or scarring.   Facial strength: Normal strength and symmetry, no synkinesis or facial tic.   Facial nerve function is 1/6.     Ears  Otoscopic examination:   Right: Complete closure of the drum, crusty blood  "present in the ear canal, post auricular incision healing well.      Nose: Dorsum symmetric with no visible or palpable deformities.     Oral Cavity/Mouth  Lips, teeth, and gums: Normal lips, gums, and dentition.     Oropharynx: Mucosa moist, no lesions.     Neck: Symmetrical, trachea midline. No masses visible.     Neurological/Psychiatric  Cranial Nerve Examination: II - XII grossly intact.  Orientation to person, place, and time: Normal.  Mood and affect: Normal.     Skin: Normal without rashes or lesions.     Pulmonary  Respiratory effort: Chest expands symmetrically.     Cardiovascular: Good peripheral pulses  Peripheral vascular system: No varicosities, carotid pulse normal, no edema. No jugular venous distension.     Extremities: Appearance of extremities: Normal. Gait normal.     Last Recorded Vitals:  Temperature 36.7 °C (98 °F), height 1.778 m (5' 10\"), weight 58.5 kg (129 lb).     Assessment/Plan   69 y.o. female with a history of left cholesteatoma and right chronic suppurative otitis media with TM perforation, she is s/p left-sided postauricular CWU mastoidectomy with cholesteatoma resection on 2/7/14 with Dr. Turner, and right-sided tympanoplasty with ossiculoplasty on 10/2/20, now s/p right side postauricular tympanoplasty, revision ossiculoplasty, and right cholesteatoma removal on 9/23/24. On physical exam, she has complete closure of the drum, crusty blood in the ear canal, her post auricular incision is healing well, and her facial nerve function is 1/6. The patient is okay to resume normal activities and to discontinue Ciprodex drops at this time. She will refrain from using her hearing aids until 10/22/24. She will follow-up in 3 months with an audiogram.     -Patient is okay to discontinue Ciprodex drops at this time  -Patient will sustain from hearing aid use until 10/22/24  -RTC in 3 months with audiogram    Scribe Attestation  By signing my name below, IMelissa, Scribe   attest " that this documentation has been prepared under the direction and in the presence of Rosana Harden MD.     I have reviewed the documentation as scribed by Grayson Calhoun and agree with the notes.   Rosana Harden MD

## 2024-10-08 ENCOUNTER — APPOINTMENT (OUTPATIENT)
Dept: OTOLARYNGOLOGY | Facility: CLINIC | Age: 69
End: 2024-10-08
Payer: MEDICARE

## 2024-10-08 VITALS — TEMPERATURE: 98 F | HEIGHT: 70 IN | WEIGHT: 129 LBS | BODY MASS INDEX: 18.47 KG/M2

## 2024-10-08 DIAGNOSIS — H72.91 TYMPANIC MEMBRANE PERFORATION, RIGHT: ICD-10-CM

## 2024-10-08 DIAGNOSIS — Z98.890 POSTOPERATIVE STATE: Primary | ICD-10-CM

## 2024-10-08 DIAGNOSIS — H66.11 CHRONIC TUBOTYMPANIC SUPPURATIVE OTITIS MEDIA OF RIGHT EAR: ICD-10-CM

## 2024-10-08 DIAGNOSIS — H71.91 CHOLESTEATOMA, MIDDLE EAR, RIGHT: ICD-10-CM

## 2024-10-08 PROBLEM — H71.92 CHOLESTEATOMA OF LEFT EAR: Status: RESOLVED | Noted: 2024-06-19 | Resolved: 2024-10-08

## 2024-10-08 PROCEDURE — 1160F RVW MEDS BY RX/DR IN RCRD: CPT | Performed by: OTOLARYNGOLOGY

## 2024-10-08 PROCEDURE — 3008F BODY MASS INDEX DOCD: CPT | Performed by: OTOLARYNGOLOGY

## 2024-10-08 PROCEDURE — 1126F AMNT PAIN NOTED NONE PRSNT: CPT | Performed by: OTOLARYNGOLOGY

## 2024-10-08 PROCEDURE — 1159F MED LIST DOCD IN RCRD: CPT | Performed by: OTOLARYNGOLOGY

## 2024-10-08 PROCEDURE — 99024 POSTOP FOLLOW-UP VISIT: CPT | Performed by: OTOLARYNGOLOGY

## 2024-10-08 ASSESSMENT — PATIENT HEALTH QUESTIONNAIRE - PHQ9
SUM OF ALL RESPONSES TO PHQ9 QUESTIONS 1 AND 2: 2
1. LITTLE INTEREST OR PLEASURE IN DOING THINGS: SEVERAL DAYS
2. FEELING DOWN, DEPRESSED OR HOPELESS: SEVERAL DAYS

## 2024-10-08 ASSESSMENT — PAIN SCALES - GENERAL: PAINLEVEL: 0-NO PAIN

## 2025-02-18 ENCOUNTER — CLINICAL SUPPORT (OUTPATIENT)
Dept: AUDIOLOGY | Facility: CLINIC | Age: 70
End: 2025-02-18
Payer: MEDICARE

## 2025-02-18 ENCOUNTER — APPOINTMENT (OUTPATIENT)
Dept: OTOLARYNGOLOGY | Facility: CLINIC | Age: 70
End: 2025-02-18
Payer: MEDICARE

## 2025-02-18 VITALS — BODY MASS INDEX: 17.92 KG/M2 | HEIGHT: 71 IN | WEIGHT: 128 LBS

## 2025-02-18 DIAGNOSIS — H71.91 CHOLESTEATOMA, MIDDLE EAR, RIGHT: ICD-10-CM

## 2025-02-18 DIAGNOSIS — H72.91 TYMPANIC MEMBRANE PERFORATION, RIGHT: Primary | ICD-10-CM

## 2025-02-18 DIAGNOSIS — H90.6 MIXED CONDUCTIVE AND SENSORINEURAL HEARING LOSS OF BOTH EARS: Primary | ICD-10-CM

## 2025-02-18 DIAGNOSIS — H66.11 CHRONIC TUBOTYMPANIC SUPPURATIVE OTITIS MEDIA OF RIGHT EAR: ICD-10-CM

## 2025-02-18 PROCEDURE — 1160F RVW MEDS BY RX/DR IN RCRD: CPT | Performed by: OTOLARYNGOLOGY

## 2025-02-18 PROCEDURE — 99213 OFFICE O/P EST LOW 20 MIN: CPT | Performed by: OTOLARYNGOLOGY

## 2025-02-18 PROCEDURE — 1159F MED LIST DOCD IN RCRD: CPT | Performed by: OTOLARYNGOLOGY

## 2025-02-18 PROCEDURE — 3008F BODY MASS INDEX DOCD: CPT | Performed by: OTOLARYNGOLOGY

## 2025-02-18 PROCEDURE — 92504 EAR MICROSCOPY EXAMINATION: CPT | Performed by: OTOLARYNGOLOGY

## 2025-02-18 PROCEDURE — 92557 COMPREHENSIVE HEARING TEST: CPT

## 2025-02-18 PROCEDURE — 92567 TYMPANOMETRY: CPT

## 2025-02-18 ASSESSMENT — PAIN - FUNCTIONAL ASSESSMENT: PAIN_FUNCTIONAL_ASSESSMENT: 0-10

## 2025-02-18 ASSESSMENT — PATIENT HEALTH QUESTIONNAIRE - PHQ9
1. LITTLE INTEREST OR PLEASURE IN DOING THINGS: NOT AT ALL
SUM OF ALL RESPONSES TO PHQ9 QUESTIONS 1 AND 2: 0
2. FEELING DOWN, DEPRESSED OR HOPELESS: NOT AT ALL

## 2025-02-18 ASSESSMENT — PAIN SCALES - GENERAL: PAINLEVEL_OUTOF10: 0 - NO PAIN
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
## 2025-08-26 ENCOUNTER — APPOINTMENT (OUTPATIENT)
Dept: OTOLARYNGOLOGY | Facility: CLINIC | Age: 70
End: 2025-08-26
Payer: MEDICARE

## 2025-08-26 VITALS — BODY MASS INDEX: 17.92 KG/M2 | HEIGHT: 71 IN | WEIGHT: 128 LBS

## 2025-08-26 DIAGNOSIS — H71.91 CHOLESTEATOMA, MIDDLE EAR, RIGHT: ICD-10-CM

## 2025-08-26 DIAGNOSIS — H66.11 CHRONIC TUBOTYMPANIC SUPPURATIVE OTITIS MEDIA OF RIGHT EAR: ICD-10-CM

## 2025-08-26 DIAGNOSIS — H72.91 TYMPANIC MEMBRANE PERFORATION, RIGHT: Primary | ICD-10-CM

## 2025-08-26 PROCEDURE — 3008F BODY MASS INDEX DOCD: CPT | Performed by: OTOLARYNGOLOGY

## 2025-08-26 PROCEDURE — 1160F RVW MEDS BY RX/DR IN RCRD: CPT | Performed by: OTOLARYNGOLOGY

## 2025-08-26 PROCEDURE — G2211 COMPLEX E/M VISIT ADD ON: HCPCS | Performed by: OTOLARYNGOLOGY

## 2025-08-26 PROCEDURE — 1159F MED LIST DOCD IN RCRD: CPT | Performed by: OTOLARYNGOLOGY

## 2025-08-26 PROCEDURE — 99213 OFFICE O/P EST LOW 20 MIN: CPT | Performed by: OTOLARYNGOLOGY

## 2025-08-26 ASSESSMENT — PATIENT HEALTH QUESTIONNAIRE - PHQ9
SUM OF ALL RESPONSES TO PHQ9 QUESTIONS 1 AND 2: 1
1. LITTLE INTEREST OR PLEASURE IN DOING THINGS: NOT AT ALL
2. FEELING DOWN, DEPRESSED OR HOPELESS: SEVERAL DAYS

## 2026-09-01 ENCOUNTER — APPOINTMENT (OUTPATIENT)
Dept: OTOLARYNGOLOGY | Facility: CLINIC | Age: 71
End: 2026-09-01
Payer: MEDICARE

## (undated) DEVICE — ELECTRODE, PAIRED SUBDERMAL OTO

## (undated) DEVICE — DRAPE, SURGICAL, OTOLOGY GLASSCOCK

## (undated) DEVICE — SUTURE, VICRYL, 4-0, 18 IN, P-3, UNDYED

## (undated) DEVICE — DRESSING, TRANSPARENT, TEGADERM, 2-3/8 X 2-3/4 IN

## (undated) DEVICE — TUBING, SUCTION, OTOMED

## (undated) DEVICE — Device

## (undated) DEVICE — GOWN, SURGICAL, SMARTGOWN, XLARGE, STERILE

## (undated) DEVICE — SYRINGE, MONOJECT, LUER LOCK, 3 CC, LF

## (undated) DEVICE — STOCKINETTE, IMPERVIOUS, 9 X 48 IN, STERILE

## (undated) DEVICE — TOWEL, SURGICAL, NEURO, O/R, 16 X 26, BLUE, STERILE

## (undated) DEVICE — SYRINGE, 20 CC, LUER LOCK, MONOJECT, W/O CAP, LF

## (undated) DEVICE — SYRINGE, COLLECTION, ABG, 1CC, LUER LOCK

## (undated) DEVICE — DRESSING, NON-ADHERENT, TELFA, OUCHLESS, 3 X 8 IN, STERILE

## (undated) DEVICE — STAPLER, SKIN PROXIMATE, 35 WIDE

## (undated) DEVICE — SYRINGE, 1 CC, LUER LOCK

## (undated) DEVICE — CLEANER, WIPE, INSTRUMENT, 3.25 X 3.25 IN

## (undated) DEVICE — GLOVE, SURGEON, PREMIERPRO PI, MICRO, SZ-6.5, PF, WH